# Patient Record
Sex: FEMALE | Race: WHITE | NOT HISPANIC OR LATINO | Employment: UNEMPLOYED | URBAN - METROPOLITAN AREA
[De-identification: names, ages, dates, MRNs, and addresses within clinical notes are randomized per-mention and may not be internally consistent; named-entity substitution may affect disease eponyms.]

---

## 2019-07-24 ENCOUNTER — OFFICE VISIT (OUTPATIENT)
Dept: ORTHOPEDICS | Facility: CLINIC | Age: 59
End: 2019-07-24
Payer: COMMERCIAL

## 2019-07-24 VITALS
SYSTOLIC BLOOD PRESSURE: 135 MMHG | HEART RATE: 65 BPM | DIASTOLIC BLOOD PRESSURE: 85 MMHG | HEIGHT: 62 IN | BODY MASS INDEX: 27.97 KG/M2 | WEIGHT: 152 LBS

## 2019-07-24 DIAGNOSIS — S46.112A PARTIAL DEGENERATIVE RUPTURE OF BICEPS TENDON, LEFT: ICD-10-CM

## 2019-07-24 DIAGNOSIS — M25.512 ACUTE PAIN OF LEFT SHOULDER: ICD-10-CM

## 2019-07-24 DIAGNOSIS — S43.432A ANTERIOR TO POSTERIOR TEAR OF SUPERIOR GLENOID LABRUM OF LEFT SHOULDER: ICD-10-CM

## 2019-07-24 DIAGNOSIS — Z01.818 PRE-OP EXAM: Primary | ICD-10-CM

## 2019-07-24 DIAGNOSIS — S43.432A SUPERIOR GLENOID LABRUM LESION OF LEFT SHOULDER, INITIAL ENCOUNTER: Primary | ICD-10-CM

## 2019-07-24 PROCEDURE — 3008F PR BODY MASS INDEX (BMI) DOCUMENTED: ICD-10-PCS | Mod: ,,, | Performed by: ORTHOPAEDIC SURGERY

## 2019-07-24 PROCEDURE — 3008F BODY MASS INDEX DOCD: CPT | Mod: ,,, | Performed by: ORTHOPAEDIC SURGERY

## 2019-07-24 PROCEDURE — 99204 OFFICE O/P NEW MOD 45 MIN: CPT | Mod: ,,, | Performed by: ORTHOPAEDIC SURGERY

## 2019-07-24 PROCEDURE — 99204 PR OFFICE/OUTPT VISIT, NEW, LEVL IV, 45-59 MIN: ICD-10-PCS | Mod: ,,, | Performed by: ORTHOPAEDIC SURGERY

## 2019-07-24 RX ORDER — DOXYCYCLINE HYCLATE 100 MG
1 TABLET ORAL
COMMUNITY

## 2019-07-24 RX ORDER — OXYBUTYNIN CHLORIDE 10 MG/1
1 TABLET, EXTENDED RELEASE ORAL 3 TIMES DAILY
COMMUNITY

## 2019-07-24 RX ORDER — VENLAFAXINE 75 MG/1
150 TABLET ORAL
COMMUNITY

## 2019-07-24 RX ORDER — CHLORTHALIDONE 25 MG/1
25 TABLET ORAL DAILY
COMMUNITY
End: 2019-11-05

## 2019-07-24 RX ORDER — LISINOPRIL 20 MG/1
20 TABLET ORAL 2 TIMES DAILY
COMMUNITY

## 2019-07-24 NOTE — PROGRESS NOTES
[facility]    Subjective:     Chief Complaint:   Chief Complaint   Patient presents with    Left Shoulder - Pain     Left shoulder pain x few months. States that she had a fall back in January and states that the pain has gotten worse. States that she has pain that radiates down her arm. She did have an XRAY at urgent care and MRI at [facility].        Past Medical History:   Diagnosis Date    Cancer     Hypertension        Past Surgical History:   Procedure Laterality Date    CHOLECYSTECTOMY      HEMICOLECTOMY      HYSTERECTOMY      SHOULDER ARTHROSCOPY         Current Outpatient Medications   Medication Sig    aspirin-calcium carbonate 81 mg-300 mg calcium(777 mg) Tab take 1 tablet (81MG)  by oral route  every day    chlorthalidone (HYGROTEN) 25 MG Tab Take 25 mg by mouth once daily.    doxycycline (VIBRA-TABS) 100 MG tablet Take 1 tablet by mouth.    lisinopril (PRINIVIL,ZESTRIL) 20 MG tablet Take 20 mg by mouth once daily.    oxybutynin (DITROPAN-XL) 10 MG 24 hr tablet Take 1 tablet by mouth.    venlafaxine (EFFEXOR) 75 MG tablet Take 1 tablet by mouth.     No current facility-administered medications for this visit.        Review of patient's allergies indicates:   Allergen Reactions    Codeine      Other reaction(s): Hives       History reviewed. No pertinent family history.    Social History     Socioeconomic History    Marital status:      Spouse name: Not on file    Number of children: Not on file    Years of education: Not on file    Highest education level: Not on file   Occupational History    Not on file   Social Needs    Financial resource strain: Not on file    Food insecurity:     Worry: Not on file     Inability: Not on file    Transportation needs:     Medical: Not on file     Non-medical: Not on file   Tobacco Use    Smoking status: Former Smoker   Substance and Sexual Activity    Alcohol use: Not on file    Drug use: Not on file    Sexual activity: Not on  file   Lifestyle    Physical activity:     Days per week: Not on file     Minutes per session: Not on file    Stress: Not on file   Relationships    Social connections:     Talks on phone: Not on file     Gets together: Not on file     Attends Jehovah's witness service: Not on file     Active member of club or organization: Not on file     Attends meetings of clubs or organizations: Not on file     Relationship status: Not on file   Other Topics Concern    Not on file   Social History Narrative    Not on file       History of present illness: This a 51 year-year-old lady fell January. 2019. Lateral blow to the left shoulder. She's had issues with the shoulder since that fall. Initially not severe slowly getting a little bit worse clearly a daily issue today. No treatment. Right shoulder 20 years ago had a labral repair an that's functioning well.  Review of Systems:    Constitution: Negative for chills, fever, and sweats.  Negative for unexplained weight loss.    HENT:  Negative for headaches and blurry vision.    Cardiovascular:Negative for chest pain or irregular heart beat. Negative for hypertension.    Respiratory:  Negative for cough and shortness of breath.    Gastrointestinal: Negative for abdominal pain, heartburn, melena, nausea, and vomitting.    Genitourinary:  Negative bladder incontinence and dysuria.    Musculoskeletal:  See HPI for details.     Neurological: Negative for numbness.    Psychiatric/Behavioral: Negative for depression.  The patient is not nervous/anxious.      Endocrine: Negative for polyuria    Hematologic/Lymphatic: Negative for bleeding problem.  Does not bruise/bleed easily.    Skin: Negative for poor would healing and rash    Objective:      Physical Examination:    Vital Signs:    Vitals:    07/24/19 0907   BP: 135/85   Pulse: 65       Body mass index is 27.8 kg/m².    This a well-developed, well nourished patient in no acute distress.  They are alert and oriented and cooperative to  examination.        Left shoulder shows that there is mild spasm in the trapezius no Spurling's. She scat mild pain with full forward forward flexion she get mild painful arc she also has some pain with apprehension in the left shoulder. Good passive range of motion no gross weakness to abduction right shoulder has a mild loss of rotation consistent with our labral reconstruction of the past. X-rays came with her left shoulder AP is unremarkable. We have an MRI the comes with her done this week and it shows believe the rotator cuff is intact. There is some fluid in the subdeltoid space is also significant fluid anterior Cee capsule and the subscapularis. There may be some subluxation of the biceps tendon. There is some fluid around the biceps tendon. And I suspect she has a labral tear that on MRI. We do not have an official radiologist's report  Pertinent New Results:    XRAY Report / Interpretation:       Assessment/Plan:      So my impression she 6 months after injury. Therapy is not can make any difference at this point. Fortunately she is right handed does just part-time work. I believe she has a labral tear SLAP type issue. Left shoulder. I think this needs surgery. We will get official radiologist's report. But clearly she still Fluid and an abnormal MRI 6 months down the road. She does have some other medical issues with a hernia that is may or may not be addressed C4 doing her shoulder. She'll let us know whether that's been take precedent or shoulders were take precedent. He did history and physical consent for arthroscopy and labral repair left shoulder with or without biceps tenodesis      This note was created using Dragon voice recognition software that occasionally misinterpreted phrases or words.

## 2019-08-05 ENCOUNTER — TELEPHONE (OUTPATIENT)
Dept: ORTHOPEDICS | Facility: CLINIC | Age: 59
End: 2019-08-05

## 2019-08-05 NOTE — TELEPHONE ENCOUNTER
----- Message from Manuela Ashford sent at 8/5/2019  9:20 AM CDT -----  Contact: patient   Pt called and would like to schedule her surgery on her LT Shoulder. Pt states that she is looking for a date in early November, she has another surgery before then.    PTs # [phone]

## 2019-09-18 ENCOUNTER — OFFICE VISIT (OUTPATIENT)
Dept: ORTHOPEDICS | Facility: CLINIC | Age: 59
End: 2019-09-18
Payer: COMMERCIAL

## 2019-09-18 VITALS
BODY MASS INDEX: 27.97 KG/M2 | HEIGHT: 62 IN | WEIGHT: 152 LBS | DIASTOLIC BLOOD PRESSURE: 74 MMHG | HEART RATE: 65 BPM | SYSTOLIC BLOOD PRESSURE: 110 MMHG

## 2019-09-18 DIAGNOSIS — S43.432A SUPERIOR GLENOID LABRUM LESION OF LEFT SHOULDER, INITIAL ENCOUNTER: Primary | ICD-10-CM

## 2019-09-18 DIAGNOSIS — M25.512 ACUTE PAIN OF LEFT SHOULDER: ICD-10-CM

## 2019-09-18 DIAGNOSIS — S46.112A PARTIAL DEGENERATIVE RUPTURE OF BICEPS TENDON, LEFT: ICD-10-CM

## 2019-09-18 PROCEDURE — 99214 OFFICE O/P EST MOD 30 MIN: CPT | Mod: S$GLB,,, | Performed by: ORTHOPAEDIC SURGERY

## 2019-09-18 PROCEDURE — 3008F BODY MASS INDEX DOCD: CPT | Mod: S$GLB,,, | Performed by: ORTHOPAEDIC SURGERY

## 2019-09-18 PROCEDURE — 99214 PR OFFICE/OUTPT VISIT, EST, LEVL IV, 30-39 MIN: ICD-10-PCS | Mod: S$GLB,,, | Performed by: ORTHOPAEDIC SURGERY

## 2019-09-18 PROCEDURE — 3008F PR BODY MASS INDEX (BMI) DOCUMENTED: ICD-10-PCS | Mod: S$GLB,,, | Performed by: ORTHOPAEDIC SURGERY

## 2019-09-18 RX ORDER — AMLODIPINE BESYLATE 5 MG/1
5 TABLET ORAL DAILY
Refills: 4 | COMMUNITY
Start: 2019-09-12

## 2019-09-18 NOTE — PROGRESS NOTES
[facility]    Subjective:     Chief Complaint:   Chief Complaint   Patient presents with    Left Shoulder - Pain     Left shoulder pain x a while states that she would like to talk about setting up surgery for her shoulder. Pain is about the same.        Past Medical History:   Diagnosis Date    Cancer     Hypertension        Past Surgical History:   Procedure Laterality Date    CHOLECYSTECTOMY      HEMICOLECTOMY      HYSTERECTOMY      SHOULDER ARTHROSCOPY         Current Outpatient Medications   Medication Sig    amLODIPine (NORVASC) 5 MG tablet Take 5 mg by mouth once daily.    aspirin-calcium carbonate 81 mg-300 mg calcium(777 mg) Tab take 1 tablet (81MG)  by oral route  every day    doxycycline (VIBRA-TABS) 100 MG tablet Take 1 tablet by mouth.    lisinopril (PRINIVIL,ZESTRIL) 20 MG tablet Take 20 mg by mouth once daily.    oxybutynin (DITROPAN-XL) 10 MG 24 hr tablet Take 1 tablet by mouth.    venlafaxine (EFFEXOR) 75 MG tablet Take 1 tablet by mouth.    chlorthalidone (HYGROTEN) 25 MG Tab Take 25 mg by mouth once daily.     No current facility-administered medications for this visit.        Review of patient's allergies indicates:   Allergen Reactions    Codeine      Other reaction(s): Hives       History reviewed. No pertinent family history.    Social History     Socioeconomic History    Marital status:      Spouse name: Not on file    Number of children: Not on file    Years of education: Not on file    Highest education level: Not on file   Occupational History    Not on file   Social Needs    Financial resource strain: Not on file    Food insecurity:     Worry: Not on file     Inability: Not on file    Transportation needs:     Medical: Not on file     Non-medical: Not on file   Tobacco Use    Smoking status: Former Smoker   Substance and Sexual Activity    Alcohol use: Not on file    Drug use: Not on file    Sexual activity: Not on file   Lifestyle     Physical activity:     Days per week: Not on file     Minutes per session: Not on file    Stress: Not on file   Relationships    Social connections:     Talks on phone: Not on file     Gets together: Not on file     Attends Congregational service: Not on file     Active member of club or organization: Not on file     Attends meetings of clubs or organizations: Not on file     Relationship status: Not on file   Other Topics Concern    Not on file   Social History Narrative    Not on file       History of present illness: Left shoulder pain. We had prepared for an arthroscopy for labral repair couple months ago she had a hernia fixed on the right doing well when she is ready to have her left shoulder surgery done. She's had an instability surgery right shoulder in the past which she is very happy with.      Review of Systems:    Constitution: Negative for chills, fever, and sweats.  Negative for unexplained weight loss.    HENT:  Negative for headaches and blurry vision.    Cardiovascular:Negative for chest pain or irregular heart beat. Negative for hypertension.    Respiratory:  Negative for cough and shortness of breath.    Gastrointestinal: Negative for abdominal pain, heartburn, melena, nausea, and vomitting.    Genitourinary:  Negative bladder incontinence and dysuria.    Musculoskeletal:  See HPI for details.     Neurological: Negative for numbness.    Psychiatric/Behavioral: Negative for depression.  The patient is not nervous/anxious.      Endocrine: Negative for polyuria    Hematologic/Lymphatic: Negative for bleeding problem.  Does not bruise/bleed easily.    Skin: Negative for poor would healing and rash    Objective:      Physical Examination:    Vital Signs:    Vitals:    09/18/19 0938   BP: 110/74   Pulse: 65       Body mass index is 27.8 kg/m².    This a well-developed, well nourished patient in no acute distress.  They are alert and oriented and cooperative to examination.        Left shoulder again  shows a pain with full range of motion. She does have pain with apprehension. Neck is nontender with no spasm  Pertinent New Results:    XRAY Report / Interpretation:       Assessment/Plan:      's our plan were expecting file a labral instability type issue with the left shoulder. Our plan is scoping her left shoulder with labral repair plus or minus biceps tenodesis. And we'll make the arrangements today history and physical already done. Work not an issue.      This note was created using Dragon voice recognition software that occasionally misinterpreted phrases or words.

## 2019-11-05 ENCOUNTER — HOSPITAL ENCOUNTER (OUTPATIENT)
Dept: PREADMISSION TESTING | Facility: HOSPITAL | Age: 59
Discharge: HOME | End: 2019-11-05
Attending: ORTHOPAEDIC SURGERY
Payer: COMMERCIAL

## 2019-11-05 ENCOUNTER — HOSPITAL ENCOUNTER (OUTPATIENT)
Dept: RADIOLOGY | Facility: HOSPITAL | Age: 59
Discharge: HOME | End: 2019-11-05
Attending: ORTHOPAEDIC SURGERY
Payer: COMMERCIAL

## 2019-11-05 VITALS — HEIGHT: 62 IN | BODY MASS INDEX: 27.42 KG/M2 | WEIGHT: 149 LBS

## 2019-11-05 DIAGNOSIS — Z01.818 PRE-OP EXAM: ICD-10-CM

## 2019-11-05 LAB
ANION GAP SERPL CALC-SCNC: 6 MMOL/L (ref 8–16)
BASOPHILS NFR BLD: 0 % (ref 0–1.9)
BUN SERPL-MCNC: 14 MG/DL (ref 6–20)
CALCIUM SERPL-MCNC: 9.2 MG/DL (ref 8.7–10.5)
CHLORIDE SERPL-SCNC: 99 MMOL/L (ref 95–110)
CO2 SERPL-SCNC: 29 MMOL/L (ref 23–29)
CREAT SERPL-MCNC: 0.6 MG/DL (ref 0.5–1.4)
DIFFERENTIAL METHOD: ABNORMAL
EOSINOPHIL NFR BLD: 1 % (ref 0–8)
ERYTHROCYTE [DISTWIDTH] IN BLOOD BY AUTOMATED COUNT: 12.6 % (ref 11.5–14.5)
EST. GFR  (AFRICAN AMERICAN): >60 ML/MIN/1.73 M^2
EST. GFR  (NON AFRICAN AMERICAN): >60 ML/MIN/1.73 M^2
GLUCOSE SERPL-MCNC: 96 MG/DL (ref 70–110)
HCT VFR BLD AUTO: 35.8 % (ref 37–48.5)
HGB BLD-MCNC: 11.9 G/DL (ref 12–16)
IMM GRANULOCYTES # BLD AUTO: ABNORMAL K/UL
LYMPHOCYTES NFR BLD: 38 % (ref 18–48)
MCH RBC QN AUTO: 29.8 PG (ref 27–31)
MCHC RBC AUTO-ENTMCNC: 33.2 G/DL (ref 32–36)
MCV RBC AUTO: 90 FL (ref 82–98)
MONOCYTES NFR BLD: 15 % (ref 4–15)
NEUTROPHILS NFR BLD: 45 % (ref 38–73)
NEUTS BAND NFR BLD MANUAL: 1 %
NRBC BLD-RTO: 0 /100 WBC
PLATELET # BLD AUTO: 286 K/UL (ref 150–350)
PLATELET BLD QL SMEAR: ABNORMAL
PMV BLD AUTO: 9.3 FL (ref 9.2–12.9)
POTASSIUM SERPL-SCNC: 4.8 MMOL/L (ref 3.5–5.1)
RBC # BLD AUTO: 4 M/UL (ref 4–5.4)
SODIUM SERPL-SCNC: 134 MMOL/L (ref 136–145)
WBC # BLD AUTO: 5.99 K/UL (ref 3.9–12.7)

## 2019-11-05 PROCEDURE — 99900104 DSU ONLY-NO CHARGE-EA ADD'L HR (STAT)

## 2019-11-05 PROCEDURE — 71046 XR CHEST PA AND LATERAL: ICD-10-PCS | Mod: 26,,, | Performed by: RADIOLOGY

## 2019-11-05 PROCEDURE — 71046 X-RAY EXAM CHEST 2 VIEWS: CPT | Mod: 26,,, | Performed by: RADIOLOGY

## 2019-11-05 PROCEDURE — 71046 X-RAY EXAM CHEST 2 VIEWS: CPT | Mod: TC,FY

## 2019-11-05 PROCEDURE — 36415 COLL VENOUS BLD VENIPUNCTURE: CPT

## 2019-11-05 PROCEDURE — 85007 BL SMEAR W/DIFF WBC COUNT: CPT

## 2019-11-05 PROCEDURE — 85027 COMPLETE CBC AUTOMATED: CPT

## 2019-11-05 PROCEDURE — 80048 BASIC METABOLIC PNL TOTAL CA: CPT

## 2019-11-05 PROCEDURE — 99900103 DSU ONLY-NO CHARGE-INITIAL HR (STAT)

## 2019-11-05 NOTE — DISCHARGE INSTRUCTIONS
To confirm, Your doctor has instructed you that surgery is scheduled for: 11/12/19    Thuy  [phone]    Please report to [facility], [facility] the morning of surgery. You must check-in and receive a wristband before going to your procedure.    Pre-Op will call the afternoon prior to surgery between 1:00 and 6:00 PM with the final arrival time.  Phone number: [phone]    PLEASE NOTE:  The surgery schedule has many variables which may affect the time of your surgery case.  Family members should be available if your surgery time changes.  Plan to be here the day of your procedure between 4-6 hours.    MEDICATIONS:  TAKE ONLY THESE MEDICATIONS WITH A SMALL SIP OF WATER THE MORNING OF YOUR PROCEDURE:      AMLODIPINE, EFFEXOR, OXYBUTYNIN,     DO NOT TAKE THESE MEDICATIONS 5-7 DAYS PRIOR to your procedure or per your surgeon's request: ASPIRIN, ALEVE, ADVIL, IBUPROFEN, FISH OIL VITAMIN E, HERBALS - LAST DOSE 11/4/19    (May take Tylenol)              ONLY if you are prescribed any types of blood thinners such as:  Aspirin, Coumadin, Plavix, Pradaxa, Xarelto, Aggrenox, Effient, Eliquis, Savasya, Brilinta, or any other, ask your surgeon whether you should stop taking them and how long before surgery you should stop.  You may also need to verify with the prescribing physician if it is ok to stop your medication.      INSTRUCTIONS IMPORTANT!!  · Do not eat or drink anything between midnight and the time of your procedure- this includes gum, mints, and candy.  · Do not smoke or drink alcoholic beverages 24 hours prior to your procedure.  · Shower the night before AND the morning of your procedure with a Chlorhexidine wash such as Hibiclens or Dial antibacterial soap from the neck down.  Do not get it on your face or in your eyes.  You may use your own shampoo and face wash. This helps your skin to be as bacteria free as possible.    · If you wear contact lenses, dentures, hearing aids or  glasses, bring a container to put them in during surgery and give to a family member for safe keeping.  Please leave all jewelry, piercing's and valuables at home.   · DO NOT remove hair from the surgery site.  Do not shave the incision site unless you are given specific instructions to do so.    · ONLY if you have been diagnosed with sleep apnea please bring your C-PAP machine.  · ONLY if you wear home oxygen please bring your portable oxygen tank the day of your procedure.  · ONLY if you have a history of OPEN HEART SURGERY you will need a clearance from your Cardiologist per Anesthesia.      · ONLY for patients requiring bowel prep, written instructions will be given by your doctor's office.  · ONLY if you have a neuro stimulator, please bring the controller with you the morning of surgery  · ONLY if a type and screen test is needed before surgery, please return:  · If your doctor has scheduled you for an overnight stay, bring a small overnight bag with any personal items you need.  · Make arrangements in advance for transportation home by a responsible adult.  It is not safe to drive a vehicle during the 24 hours after anesthesia.      · Visiting hours are 10:00AM to 8:30PM.  For the safety of all patients, visitors under the age of 12 are not allowed above the first floor of the hospital.    · All [facility] facilities and properties are tobacco free.  Smoking is NOT allowed.       If you have any questions about these instructions, call Pre-Op Admit  Nursing at [phone] or the Pre-Op Day Surgery Unit at [phone].

## 2019-11-11 ENCOUNTER — ANESTHESIA EVENT (OUTPATIENT)
Dept: SURGERY | Facility: HOSPITAL | Age: 59
End: 2019-11-11
Payer: COMMERCIAL

## 2019-11-12 ENCOUNTER — ANESTHESIA (OUTPATIENT)
Dept: SURGERY | Facility: HOSPITAL | Age: 59
End: 2019-11-12
Payer: COMMERCIAL

## 2019-11-12 ENCOUNTER — HOSPITAL ENCOUNTER (OUTPATIENT)
Facility: HOSPITAL | Age: 59
Discharge: HOME | End: 2019-11-12
Attending: ORTHOPAEDIC SURGERY | Admitting: ORTHOPAEDIC SURGERY
Payer: COMMERCIAL

## 2019-11-12 VITALS
TEMPERATURE: 98 F | RESPIRATION RATE: 20 BRPM | HEART RATE: 72 BPM | WEIGHT: 149 LBS | SYSTOLIC BLOOD PRESSURE: 160 MMHG | OXYGEN SATURATION: 93 % | BODY MASS INDEX: 27.42 KG/M2 | HEIGHT: 62 IN | DIASTOLIC BLOOD PRESSURE: 78 MMHG

## 2019-11-12 DIAGNOSIS — S43.432A ANTERIOR TO POSTERIOR TEAR OF SUPERIOR GLENOID LABRUM OF LEFT SHOULDER: ICD-10-CM

## 2019-11-12 DIAGNOSIS — Z01.818 PRE-OP EXAM: ICD-10-CM

## 2019-11-12 PROCEDURE — D9220A PRA ANESTHESIA: ICD-10-PCS | Mod: CRNA,,, | Performed by: NURSE ANESTHETIST, CERTIFIED REGISTERED

## 2019-11-12 PROCEDURE — 71000033 HC RECOVERY, INTIAL HOUR: Performed by: ORTHOPAEDIC SURGERY

## 2019-11-12 PROCEDURE — S0020 INJECTION, BUPIVICAINE HYDRO: HCPCS | Performed by: ANESTHESIOLOGY

## 2019-11-12 PROCEDURE — 25000003 PHARM REV CODE 250: Performed by: ANESTHESIOLOGY

## 2019-11-12 PROCEDURE — 99900104 DSU ONLY-NO CHARGE-EA ADD'L HR (STAT): Performed by: ORTHOPAEDIC SURGERY

## 2019-11-12 PROCEDURE — D9220A PRA ANESTHESIA: Mod: ANES,,, | Performed by: ANESTHESIOLOGY

## 2019-11-12 PROCEDURE — 76942 PR U/S GUIDANCE FOR NEEDLE GUIDANCE: ICD-10-PCS | Mod: 26,,, | Performed by: ANESTHESIOLOGY

## 2019-11-12 PROCEDURE — 37000008 HC ANESTHESIA 1ST 15 MINUTES: Performed by: ORTHOPAEDIC SURGERY

## 2019-11-12 PROCEDURE — 37000009 HC ANESTHESIA EA ADD 15 MINS: Performed by: ORTHOPAEDIC SURGERY

## 2019-11-12 PROCEDURE — 36000710: Performed by: ORTHOPAEDIC SURGERY

## 2019-11-12 PROCEDURE — 63600175 PHARM REV CODE 636 W HCPCS: Performed by: NURSE ANESTHETIST, CERTIFIED REGISTERED

## 2019-11-12 PROCEDURE — 63600175 PHARM REV CODE 636 W HCPCS

## 2019-11-12 PROCEDURE — C1713 ANCHOR/SCREW BN/BN,TIS/BN: HCPCS | Performed by: ORTHOPAEDIC SURGERY

## 2019-11-12 PROCEDURE — D9220A PRA ANESTHESIA: ICD-10-PCS | Mod: ANES,,, | Performed by: ANESTHESIOLOGY

## 2019-11-12 PROCEDURE — 99900103 DSU ONLY-NO CHARGE-INITIAL HR (STAT): Performed by: ORTHOPAEDIC SURGERY

## 2019-11-12 PROCEDURE — 63600175 PHARM REV CODE 636 W HCPCS: Performed by: ANESTHESIOLOGY

## 2019-11-12 PROCEDURE — 71000015 HC POSTOP RECOV 1ST HR: Performed by: ORTHOPAEDIC SURGERY

## 2019-11-12 PROCEDURE — 64415 PR NERVE BLOCK INJ, ANES/STEROID, BRACHIAL PLEXUS, INCL IMAG GUIDANCE: ICD-10-PCS | Mod: 59,LT,, | Performed by: ANESTHESIOLOGY

## 2019-11-12 PROCEDURE — 36000711: Performed by: ORTHOPAEDIC SURGERY

## 2019-11-12 PROCEDURE — D9220A PRA ANESTHESIA: Mod: CRNA,,, | Performed by: NURSE ANESTHETIST, CERTIFIED REGISTERED

## 2019-11-12 PROCEDURE — 25000003 PHARM REV CODE 250: Performed by: NURSE ANESTHETIST, CERTIFIED REGISTERED

## 2019-11-12 PROCEDURE — 27201423 OPTIME MED/SURG SUP & DEVICES STERILE SUPPLY: Performed by: ORTHOPAEDIC SURGERY

## 2019-11-12 PROCEDURE — C9290 INJ, BUPIVACAINE LIPOSOME: HCPCS | Performed by: ANESTHESIOLOGY

## 2019-11-12 PROCEDURE — 63600175 PHARM REV CODE 636 W HCPCS: Performed by: ORTHOPAEDIC SURGERY

## 2019-11-12 PROCEDURE — 71000039 HC RECOVERY, EACH ADD'L HOUR: Performed by: ORTHOPAEDIC SURGERY

## 2019-11-12 PROCEDURE — 76942 ECHO GUIDE FOR BIOPSY: CPT | Mod: 26,,, | Performed by: ANESTHESIOLOGY

## 2019-11-12 PROCEDURE — 76942 ECHO GUIDE FOR BIOPSY: CPT | Performed by: ANESTHESIOLOGY

## 2019-11-12 PROCEDURE — 64415 NJX AA&/STRD BRCH PLXS IMG: CPT | Mod: 59,LT,, | Performed by: ANESTHESIOLOGY

## 2019-11-12 PROCEDURE — 64415 NJX AA&/STRD BRCH PLXS IMG: CPT | Performed by: ANESTHESIOLOGY

## 2019-11-12 DEVICE — ANCHOR BIOCOMPOSITE 2.9X15.5MM: Type: IMPLANTABLE DEVICE | Site: SHOULDER | Status: FUNCTIONAL

## 2019-11-12 RX ORDER — SODIUM CHLORIDE, SODIUM LACTATE, POTASSIUM CHLORIDE, CALCIUM CHLORIDE 600; 310; 30; 20 MG/100ML; MG/100ML; MG/100ML; MG/100ML
INJECTION, SOLUTION INTRAVENOUS CONTINUOUS
Status: DISCONTINUED | OUTPATIENT
Start: 2019-11-12 | End: 2019-11-12 | Stop reason: HOSPADM

## 2019-11-12 RX ORDER — HYDROMORPHONE HYDROCHLORIDE 2 MG/ML
0.2 INJECTION, SOLUTION INTRAMUSCULAR; INTRAVENOUS; SUBCUTANEOUS EVERY 5 MIN PRN
Status: DISCONTINUED | OUTPATIENT
Start: 2019-11-12 | End: 2019-11-12 | Stop reason: HOSPADM

## 2019-11-12 RX ORDER — ACETAMINOPHEN 10 MG/ML
INJECTION, SOLUTION INTRAVENOUS
Status: DISCONTINUED | OUTPATIENT
Start: 2019-11-12 | End: 2019-11-12

## 2019-11-12 RX ORDER — HYDROCODONE BITARTRATE AND ACETAMINOPHEN 10; 325 MG/1; MG/1
1 TABLET ORAL EVERY 4 HOURS PRN
Qty: 30 TABLET | Refills: 0 | Status: SHIPPED | OUTPATIENT
Start: 2019-11-12 | End: 2020-01-06 | Stop reason: ALTCHOICE

## 2019-11-12 RX ORDER — HYDROCODONE BITARTRATE AND ACETAMINOPHEN 5; 325 MG/1; MG/1
1 TABLET ORAL EVERY 4 HOURS PRN
Status: DISCONTINUED | OUTPATIENT
Start: 2019-11-12 | End: 2019-11-12 | Stop reason: HOSPADM

## 2019-11-12 RX ORDER — OXYCODONE HYDROCHLORIDE 5 MG/1
5 TABLET ORAL
Status: DISCONTINUED | OUTPATIENT
Start: 2019-11-12 | End: 2019-11-12 | Stop reason: HOSPADM

## 2019-11-12 RX ORDER — SUCCINYLCHOLINE CHLORIDE 20 MG/ML
INJECTION INTRAMUSCULAR; INTRAVENOUS
Status: DISCONTINUED | OUTPATIENT
Start: 2019-11-12 | End: 2019-11-12

## 2019-11-12 RX ORDER — DEXAMETHASONE SODIUM PHOSPHATE 4 MG/ML
INJECTION, SOLUTION INTRA-ARTICULAR; INTRALESIONAL; INTRAMUSCULAR; INTRAVENOUS; SOFT TISSUE
Status: DISCONTINUED | OUTPATIENT
Start: 2019-11-12 | End: 2019-11-12

## 2019-11-12 RX ORDER — SODIUM CHLORIDE 0.9 % (FLUSH) 0.9 %
10 SYRINGE (ML) INJECTION
Status: DISCONTINUED | OUTPATIENT
Start: 2019-11-12 | End: 2019-11-12 | Stop reason: HOSPADM

## 2019-11-12 RX ORDER — ONDANSETRON 2 MG/ML
INJECTION INTRAMUSCULAR; INTRAVENOUS
Status: DISCONTINUED | OUTPATIENT
Start: 2019-11-12 | End: 2019-11-12

## 2019-11-12 RX ORDER — EPINEPHRINE 1 MG/ML
INJECTION, SOLUTION INTRACARDIAC; INTRAMUSCULAR; INTRAVENOUS; SUBCUTANEOUS
Status: DISCONTINUED | OUTPATIENT
Start: 2019-11-12 | End: 2019-11-12 | Stop reason: HOSPADM

## 2019-11-12 RX ORDER — ASPIRIN 81 MG/1
81 TABLET ORAL DAILY
COMMUNITY

## 2019-11-12 RX ORDER — FENTANYL CITRATE 50 UG/ML
INJECTION, SOLUTION INTRAMUSCULAR; INTRAVENOUS
Status: DISCONTINUED | OUTPATIENT
Start: 2019-11-12 | End: 2019-11-12

## 2019-11-12 RX ORDER — MIDAZOLAM HYDROCHLORIDE 1 MG/ML
INJECTION, SOLUTION INTRAMUSCULAR; INTRAVENOUS
Status: DISCONTINUED | OUTPATIENT
Start: 2019-11-12 | End: 2019-11-12

## 2019-11-12 RX ORDER — PROMETHAZINE HYDROCHLORIDE 25 MG/1
25 TABLET ORAL EVERY 8 HOURS PRN
Qty: 30 TABLET | Refills: 0 | Status: SHIPPED | OUTPATIENT
Start: 2019-11-12 | End: 2021-05-11

## 2019-11-12 RX ORDER — HYDROMORPHONE HYDROCHLORIDE 2 MG/ML
1 INJECTION, SOLUTION INTRAMUSCULAR; INTRAVENOUS; SUBCUTANEOUS EVERY 4 HOURS PRN
Status: DISCONTINUED | OUTPATIENT
Start: 2019-11-12 | End: 2019-11-12 | Stop reason: HOSPADM

## 2019-11-12 RX ORDER — ONDANSETRON 2 MG/ML
4 INJECTION INTRAMUSCULAR; INTRAVENOUS EVERY 12 HOURS PRN
Status: DISCONTINUED | OUTPATIENT
Start: 2019-11-12 | End: 2019-11-12 | Stop reason: HOSPADM

## 2019-11-12 RX ORDER — BUPIVACAINE HYDROCHLORIDE 5 MG/ML
INJECTION, SOLUTION EPIDURAL; INTRACAUDAL
Status: COMPLETED | OUTPATIENT
Start: 2019-11-12 | End: 2019-11-12

## 2019-11-12 RX ORDER — LIDOCAINE HYDROCHLORIDE 10 MG/ML
1 INJECTION, SOLUTION EPIDURAL; INFILTRATION; INTRACAUDAL; PERINEURAL ONCE
Status: COMPLETED | OUTPATIENT
Start: 2019-11-12 | End: 2019-11-12

## 2019-11-12 RX ORDER — FENTANYL CITRATE 50 UG/ML
25 INJECTION, SOLUTION INTRAMUSCULAR; INTRAVENOUS EVERY 5 MIN PRN
Status: DISCONTINUED | OUTPATIENT
Start: 2019-11-12 | End: 2019-11-12 | Stop reason: HOSPADM

## 2019-11-12 RX ORDER — ROCURONIUM BROMIDE 10 MG/ML
INJECTION, SOLUTION INTRAVENOUS
Status: DISCONTINUED | OUTPATIENT
Start: 2019-11-12 | End: 2019-11-12

## 2019-11-12 RX ORDER — LIDOCAINE HCL/PF 100 MG/5ML
SYRINGE (ML) INTRAVENOUS
Status: DISCONTINUED | OUTPATIENT
Start: 2019-11-12 | End: 2019-11-12

## 2019-11-12 RX ORDER — PROPOFOL 10 MG/ML
VIAL (ML) INTRAVENOUS
Status: DISCONTINUED | OUTPATIENT
Start: 2019-11-12 | End: 2019-11-12

## 2019-11-12 RX ADMIN — LIDOCAINE HYDROCHLORIDE 75 MG: 20 INJECTION, SOLUTION INTRAVENOUS at 10:11

## 2019-11-12 RX ADMIN — SODIUM CHLORIDE, SODIUM LACTATE, POTASSIUM CHLORIDE, AND CALCIUM CHLORIDE: .6; .31; .03; .02 INJECTION, SOLUTION INTRAVENOUS at 08:11

## 2019-11-12 RX ADMIN — ACETAMINOPHEN 1000 MG: 10 INJECTION, SOLUTION INTRAVENOUS at 10:11

## 2019-11-12 RX ADMIN — MIDAZOLAM 1 MG: 1 INJECTION INTRAMUSCULAR; INTRAVENOUS at 09:11

## 2019-11-12 RX ADMIN — DEXAMETHASONE SODIUM PHOSPHATE 4 MG: 4 INJECTION, SOLUTION INTRAMUSCULAR; INTRAVENOUS at 10:11

## 2019-11-12 RX ADMIN — PROPOFOL 170 MG: 10 INJECTION, EMULSION INTRAVENOUS at 10:11

## 2019-11-12 RX ADMIN — ONDANSETRON 4 MG: 2 INJECTION, SOLUTION INTRAMUSCULAR; INTRAVENOUS at 10:11

## 2019-11-12 RX ADMIN — BUPIVACAINE 10 ML: 13.3 INJECTION, SUSPENSION, LIPOSOMAL INFILTRATION at 09:11

## 2019-11-12 RX ADMIN — MIDAZOLAM 1 MG: 1 INJECTION INTRAMUSCULAR; INTRAVENOUS at 10:11

## 2019-11-12 RX ADMIN — FENTANYL CITRATE 50 MCG: 50 INJECTION, SOLUTION INTRAMUSCULAR; INTRAVENOUS at 09:11

## 2019-11-12 RX ADMIN — CEFAZOLIN 2 G: 1 INJECTION, POWDER, FOR SOLUTION INTRAVENOUS at 10:11

## 2019-11-12 RX ADMIN — FENTANYL CITRATE 50 MCG: 50 INJECTION, SOLUTION INTRAMUSCULAR; INTRAVENOUS at 10:11

## 2019-11-12 RX ADMIN — SODIUM CHLORIDE, SODIUM LACTATE, POTASSIUM CHLORIDE, AND CALCIUM CHLORIDE: .6; .31; .03; .02 INJECTION, SOLUTION INTRAVENOUS at 11:11

## 2019-11-12 RX ADMIN — BUPIVACAINE HYDROCHLORIDE 5 ML: 5 INJECTION, SOLUTION EPIDURAL; INTRACAUDAL; PERINEURAL at 09:11

## 2019-11-12 RX ADMIN — SUCCINYLCHOLINE CHLORIDE 120 MG: 20 INJECTION, SOLUTION INTRAMUSCULAR; INTRAVENOUS at 10:11

## 2019-11-12 RX ADMIN — ROCURONIUM BROMIDE 10 MG: 10 INJECTION, SOLUTION INTRAVENOUS at 10:11

## 2019-11-12 RX ADMIN — LIDOCAINE HYDROCHLORIDE: 10 INJECTION, SOLUTION EPIDURAL; INFILTRATION; INTRACAUDAL; PERINEURAL at 08:11

## 2019-11-12 NOTE — ANESTHESIA PROCEDURE NOTES
Intubation  Performed by: Max Dutton CRNA  Authorized by: Geovanna Mancilla MD     Intubation:     Induction:  Intravenous    Intubated:  Postinduction    Mask Ventilation:  Easy mask    Attempts:  1    Attempted By:  CRNA    Method of Intubation:  Direct    Blade:  Castorena 2    Laryngeal View Grade: Grade I - full view of chords      Difficult Airway Encountered?: No      Complications:  None    Airway Device Size:  7.0    Style/Cuff Inflation:  Cuffed    Inflation Amount (mL):  4    Tube secured:  20    Secured at:  The lips    Placement Verified By:  Capnometry and Colorimetric ETCO2 device    Complicating Factors:  None    Findings Post-Intubation:  BS equal bilateral

## 2019-11-12 NOTE — ANESTHESIA PREPROCEDURE EVALUATION
11/12/2019  Tammy Peterson is a 59 y.o., female.    Anesthesia Evaluation    I have reviewed the Patient Summary Reports.    I have reviewed the Nursing Notes.      Review of Systems  Anesthesia Hx:  No problems with previous Anesthesia    Cardiovascular:   Hypertension        Physical Exam  General:  Well nourished    Airway/Jaw/Neck:  Airway Findings: Mouth Opening: Normal Tongue: Normal  General Airway Assessment: Adult  Mallampati: II  TM Distance: Normal, at least 6 cm  Jaw/Neck Findings:  Neck ROM: Normal ROM     Eyes/Ears/Nose:  Eyes/Ears/Nose Findings:    Dental:  Dental Findings: In tact   Chest/Lungs:  Chest/Lungs Findings: Normal Respiratory Rate     Heart/Vascular:  Heart Findings: Rate: Normal  Rhythm: Regular Rhythm        Mental Status:  Mental Status Findings:  Cooperative, Alert and Oriented         Anesthesia Plan  Type of Anesthesia, risks & benefits discussed:  Anesthesia Type:  general  Patient's Preference: General  Intra-op Monitoring Plan: standard ASA monitors  Intra-op Monitoring Plan Comments:   Post Op Pain Control Plan: peripheral nerve block, multimodal analgesia, IV/PO Opioids PRN and per primary service following discharge from PACU  Post Op Pain Control Plan Comments:   Induction:   IV  Beta Blocker:  Patient is not currently on a Beta-Blocker (No further documentation required).       Informed Consent: Patient understands risks and agrees with Anesthesia plan.  Questions answered. Anesthesia consent signed with patient.  ASA Score: 2     Day of Surgery Review of History & Physical:    H&P update referred to the surgeon.         Ready For Surgery From Anesthesia Perspective.

## 2019-11-12 NOTE — OP NOTE
DATE OF PROCEDURE:  11/12/2019    PROCEDURES PERFORMED:  Arthroscopy of left shoulder with debridement of glenoid   labrum and anterior and posterior labral repair.    PREOPERATIVE DIAGNOSIS:  Anterior and posterior labral tear.    POSTOPERATIVE DIAGNOSIS:  Instability of left shoulder, anterior and posterior,   with degenerative labral tearing.    ANESTHESIA:  General.    SURGEON:  Aston Lara Jr., M.D.    ASSISTANT:  Marbella Chavez.    PROCEDURE IN DETAIL:  The patient was taken to the Operating Room, placed under   general anesthesia, then turned to lateral position on a beanbag, left arm in   skin traction, prepped and draped the left shoulder in a satisfactory manner.    We introduced our spinal needle posterior in the joint followed with the scope   posterior in the joint and then made an anterior portal inside out.  Inside the   joint, there was lots of debris, ragged tissue and synovitis.  We could   appreciate there were significant degenerative changes on the humeral head, some   of which were debrided, fairly diffuse.  We could see some early degeneration   on the glenoid, clearly more wear on the anterior-inferior corner of the glenoid   and we were impressed by the amount of redundant anterior labral tissue such   that the humeral head was perched anterior and had very impressive anterior   instability of the shoulder, moved all around.  The biceps tendon was still   reasonably attached.  She had an anatomical variant where there was not much   anterior attachment of the biceps, but posterior to the biceps looked pretty   good.  So, we began by debriding the labrum we could debride and we then   detached this anterior-inferior capsular tissue from our anterior portal.  We   then passed a series of sutures and then 2.9 suture anchors to reef up and beef   up that anterior-inferior corner of the glenoid labral and capsular tissue and   we were able to put a total of 5 anchors anterior from roughly the 7  o'clock   position up to the 10 o'clock position, made a big difference in negating that   big void of tissue and the humeral head sat in place much better when we   finished.  We then swapped portals. looked from front to back, again   degenerative labral tissue, I did put 1 more anchor posterior, again advancing   some of the capsular tissue and putting a single anchor posterior.  I tried to   do that a second time, but I could not get the anchor in correctly, so I thought   1 anchor was probably going to help us enough with lot of irritated   degenerative tissue in the shoulder, so when we finished with that, we then went   subdeltoid, took a look around and removed some bursal tissue.  The surface of   the rotator cuff from above looked to be in good shape and there were no   significant degenerative or impingement problems.  We then removed the scope,   sutured the portals with nylon, then applied a bulky dressing and sling and   reversed from anesthesia.      LEXIE/IN  dd: 11/12/2019 12:06:01 (CST)  td: 11/12/2019 13:41:46 (CST)  Doc ID   #[id number]  Job ID #[id number]    CC:

## 2019-11-12 NOTE — BRIEF OP NOTE
[facility]  Brief Operative Note     SUMMARY     Surgery Date: 11/12/2019     Surgeon(s) and Role:     * Aston Lara Jr., MD - Primary    Assisting Surgeon: None    Pre-op Diagnosis:  Pre-op exam [Z01.818]    Post-op Diagnosis:  Post-Op Diagnosis Codes:     * Pre-op exam [Z01.818]    Procedure(s) (LRB):  ARTHROSCOPY, SHOULDER (Left)    Anesthesia: General    Description of the findings of the procedure:     Estimated Blood Loss: * No values recorded between 11/12/2019 10:45 AM and 11/12/2019 11:58 AM *         Specimens:   Specimen (12h ago, onward)    None          Discharge Note    SUMMARY     Admit Date: 11/12/2019    Discharge Date and Time:  11/12/2019 12:00 PM    Hospital Course (synopsis of major diagnoses, care, treatment, and services provided during the course of the hospital stay): [facility]     Final Diagnosis: Post-Op Diagnosis Codes:     * Pre-op exam [Z01.818]    Disposition: Home or Self Care    Follow Up/Patient Instructions:     Medications:  Reconciled Home Medications:   Current Discharge Medication List      START taking these medications    Details   HYDROcodone-acetaminophen (NORCO)  mg per tablet Take 1 tablet by mouth every 4 (four) hours as needed for Pain.  Qty: 30 tablet, Refills: 0    Comments: Quantity prescribed more than 7 day supply? No      promethazine (PHENERGAN) 25 MG tablet Take 1 tablet (25 mg total) by mouth every 8 (eight) hours as needed for Nausea.  Qty: 30 tablet, Refills: 0         CONTINUE these medications which have NOT CHANGED    Details   amLODIPine (NORVASC) 5 MG tablet Take 5 mg by mouth once daily.  Refills: 4      aspirin (ECOTRIN) 81 MG EC tablet Take 81 mg by mouth once daily.      doxycycline (VIBRA-TABS) 100 MG tablet Take 1 tablet by mouth.      lisinopril (PRINIVIL,ZESTRIL) 20 MG tablet Take 20 mg by mouth once daily.      oxybutynin (DITROPAN-XL) 10 MG 24 hr tablet Take 1 tablet by mouth.      venlafaxine  (EFFEXOR) 75 MG tablet Take 1 tablet by mouth.           Discharge Procedure Orders   SLING [employer]OPEDIC MEDIUM FOR HOME USE     Diet general     Ice to affected area     No driving, operating heavy equipment or signing legal documents while taking pain medication     Call MD for:  temperature >100.4     Call MD for:  persistent nausea and vomiting     Call MD for:  severe uncontrolled pain     Call MD for:  difficulty breathing, headache or visual disturbances     Call MD for:  redness, tenderness, or signs of infection (pain, swelling, redness, odor or green/yellow discharge around incision site)     Call MD for:  hives     Call MD for:  persistent dizziness or light-headedness     Call MD for:  extreme fatigue     Remove dressing in 48 hours     Shower on day dressing removed (No bath)     Follow-up Information     Aston Lara Jr, MD In 2 weeks.    Specialties:  Orthopedic Surgery, Surgery  Contact information:  [address] [facility]  [address] [place] [id number]  [phone]                 [facility] Operative Note     SUMMARY     Surgery Date: 11/12/2019     Surgeon(s) and Role:     * Aston Lara Jr., MD - Primary    Assisting Surgeon: None    Pre-op Diagnosis:  Pre-op exam [Z01.818]    Post-op Diagnosis:  Post-Op Diagnosis Codes:      Procedure(s) (LRB):  ARTHROSCOPY, SHOULDER (Left)    Anesthesia: General    Description of the findings of the procedure:     Estimated Blood Loss: 0* No values recorded between 11/12/2019 10:45 AM and 11/12/2019 11:58 AM0         Specimens:   Specimen (12h ago, onward)    None          Discharge Note    SUMMARY     Admit Date: 11/12/2019    Discharge Date and Time:  11/12/2019 12:00 PM    Hospital Course (synopsis of major diagnoses, care, treatment, and services provided during the course of the hospital stay): [facility]     Final Diagnosis: Post-Op Diagnosis Codes:     * Pre-op exam [Z01.818]labral tear    Disposition: Home or Self  Care    Follow Up/Patient Instructions:     Medications:  Reconciled Home Medications:   Current Discharge Medication List      START taking these medications    Details   HYDROcodone-acetaminophen (NORCO)  mg per tablet Take 1 tablet by mouth every 4 (four) hours as needed for Pain.  Qty: 30 tablet, Refills: 0    Comments: Quantity prescribed more than 7 day supply? No      promethazine (PHENERGAN) 25 MG tablet Take 1 tablet (25 mg total) by mouth every 8 (eight) hours as needed for Nausea.  Qty: 30 tablet, Refills: 0         CONTINUE these medications which have NOT CHANGED    Details   amLODIPine (NORVASC) 5 MG tablet Take 5 mg by mouth once daily.  Refills: 4      aspirin (ECOTRIN) 81 MG EC tablet Take 81 mg by mouth once daily.      doxycycline (VIBRA-TABS) 100 MG tablet Take 1 tablet by mouth.      lisinopril (PRINIVIL,ZESTRIL) 20 MG tablet Take 20 mg by mouth once daily.      oxybutynin (DITROPAN-XL) 10 MG 24 hr tablet Take 1 tablet by mouth.      venlafaxine (EFFEXOR) 75 MG tablet Take 1 tablet by mouth.           Discharge Procedure Orders   SLING ORTHOPEDIC MEDIUM FOR HOME USE     Diet general     Ice to affected area     No driving, operating heavy equipment or signing legal documents while taking pain medication     Call MD for:  temperature >100.4     Call MD for:  persistent nausea and vomiting     Call MD for:  severe uncontrolled pain     Call MD for:  difficulty breathing, headache or visual disturbances     Call MD for:  redness, tenderness, or signs of infection (pain, swelling, redness, odor or green/yellow discharge around incision site)     Call MD for:  hives     Call MD for:  persistent dizziness or light-headedness     Call MD for:  extreme fatigue     Remove dressing in 48 hours     Shower on day dressing removed (No bath)     Follow-up Information     Aston Lara Jr, MD In 2 weeks.    Specialties:  Orthopedic Surgery, Surgery  Contact information:  [address] [facility]  [address]  [facility] [address]  [phone]               [facility]  Brief Operative Note    SUMMARY     Surgery Date: 11/12/2019     Surgeon(s) and Role:     * Aston Lara Jr., MD - Primary    Assisting Surgeon: None    Pre-op Diagnosis:  Pre-op exam [Z01.818]    Post-op Diagnosis:  Post-Op Diagnosis Codes:     * Pre-op exam [Z01.818]    Procedure(s) (LRB):  ARTHROSCOPY, SHOULDER (Left)  anter and post labral repair  Anesthesia: General    Description of the findings of the procedure:instabiklity    Estimated Blood Loss: * No values recorded between 11/12/2019 10:45 AM and 11/12/2019 11:58 AM *         Specimens:   Specimen (12h ago, onward)    None

## 2019-11-12 NOTE — TRANSFER OF CARE
"Anesthesia Transfer of Care Note    Patient: Tammy Peterson    Procedure(s) Performed: Procedure(s) (LRB):  ARTHROSCOPY, SHOULDER (Left)    Patient location: PACU    Anesthesia Type: general    Transport from OR: Transported from OR on 2-3 L/min O2 by NC with adequate spontaneous ventilation    Post pain: adequate analgesia    Post assessment: no apparent anesthetic complications    Post vital signs: stable    Level of consciousness: awake    Nausea/Vomiting: no nausea/vomiting    Complications: none    Transfer of care protocol was followed      Last vitals:   Visit Vitals  BP (!) 146/81   Pulse 66   Temp 36.7 °C (98.1 °F) (Skin)   Resp 16   Ht 5' 2" (1.575 m)   Wt 67.6 kg (149 lb)   SpO2 100%   Breastfeeding? No   BMI 27.25 kg/m²     "

## 2019-11-12 NOTE — ANESTHESIA POSTPROCEDURE EVALUATION
Anesthesia Post Evaluation    Patient: Tammy Peterson    Procedure(s) Performed: Procedure(s) (LRB):  ARTHROSCOPY, SHOULDER (Left)    Final Anesthesia Type: general  Patient location during evaluation: PACU  Patient participation: Yes- Able to Participate  Level of consciousness: awake and alert, oriented and awake  Post-procedure vital signs: reviewed and stable  Pain management: adequate  Airway patency: patent  PONV status at discharge: No PONV  Anesthetic complications: no      Cardiovascular status: blood pressure returned to baseline and hemodynamically stable  Respiratory status: unassisted, spontaneous ventilation and room air  Hydration status: euvolemic  Follow-up not needed.          Vitals Value Taken Time   /78 11/12/2019  1:36 PM   Temp 36.6 °C (97.9 °F) 11/12/2019  1:00 PM   Pulse 68 11/12/2019  1:36 PM   Resp 39 11/12/2019  1:36 PM   SpO2 95 % 11/12/2019  1:36 PM   Vitals shown include unvalidated device data.      Event Time     Out of Recovery 13:36:34          Pain/Beena Score: Pain Rating Prior to Med Admin: 0 (11/12/2019  1:15 PM)  Pain Rating Post Med Admin: 0 (11/12/2019  1:52 PM)  Beena Score: 10 (11/12/2019  1:52 PM)

## 2019-11-12 NOTE — ANESTHESIA PROCEDURE NOTES
Peripheral Block    Patient location during procedure: pre-op   Block not for primary anesthetic.  Reason for block: at surgeon's request and post-op pain management   Post-op Pain Location: left shoulder  Start time: 11/12/2019 9:01 AM  Timeout: 11/12/2019 9:00 AM   End time: 11/12/2019 9:05 AM    Staffing  Authorizing Provider: Geovanna Mancilla MD  Performing Provider: Geovanna Mancilla MD    Preanesthetic Checklist  Completed: patient identified, site marked, surgical consent, pre-op evaluation, timeout performed, IV checked, risks and benefits discussed and monitors and equipment checked  Peripheral Block  Patient position: sitting  Prep: ChloraPrep  Patient monitoring: heart rate, cardiac monitor, continuous pulse ox, continuous capnometry and frequent blood pressure checks  Block type: interscalene  Laterality: left  Injection technique: single shot  Needle  Needle type: Stimuplex   Needle gauge: 22 G  Needle length: 2 in  Needle localization: anatomical landmarks and ultrasound guidance   -ultrasound image captured on disc.  Assessment  Injection assessment: negative aspiration, negative parasthesia and local visualized surrounding nerve  Paresthesia pain: none  Heart rate change: no  Slow fractionated injection: yes  Additional Notes  VSS.  [facility] RN monitoring vitals throughout procedure.  Patient tolerated procedure well.     exparel 10mL

## 2019-11-12 NOTE — DISCHARGE INSTRUCTIONS
"Discharge Instructions: After Your Surgery/Procedure  Youve just had surgery. During surgery you were given medicine called anesthesia to keep you relaxed and free of pain. After surgery you may have some pain or nausea. This is common. Here are some tips for feeling better and getting well after surgery.     Stay on schedule with your medication.   Going home  Your doctor or nurse will show you how to take care of yourself when you go home. He or she will also answer your questions. Have an adult family member or friend drive you home.      For your safety we recommend these precaution for the first 24 hours after your procedure:  · Do not drive or use heavy equipment.  · Do not make important decisions or sign legal papers.  · Do not drink alcohol.  · Have someone stay with you, if needed. He or she can watch for problems and help keep you safe.  · Your concentration, balance, coordination, and judgement may be impaired for many hours after anesthesia.  Use caution when ambulating or standing up.     · You may feel weak and "washed out" after anesthesia and surgery.      Subtle residual effects of general anesthesia or sedation with regional / local anesthesia can last more than 24 hours.  Rest for the remainder of the day or longer if your Doctor/Surgeon has advised you to do so.  Although you may feel normal within the first 24 hours, your reflexes and mental ability may be impaired without you realizing it.  You may feel dizzy, lightheaded or sleepy for 24 hours or longer.      Be sure to go to all follow-up visits with your doctor. And rest after your surgery for as long as your doctor tells you to.  Coping with pain  If you have pain after surgery, pain medicine will help you feel better. Take it as told, before pain becomes severe. Also, ask your doctor or pharmacist about other ways to control pain. This might be with heat, ice, or relaxation. And follow any other instructions your surgeon or nurse gives " you.  Tips for taking pain medicine  To get the best relief possible, remember these points:  · Pain medicines can upset your stomach. Taking them with a little food may help.  · Most pain relievers taken by mouth need at least 20 to 30 minutes to start to work.  · Taking medicine on a schedule can help you remember to take it. Try to time your medicine so that you can take it before starting an activity. This might be before you get dressed, go for a walk, or sit down for dinner.  · Constipation is a common side effect of pain medicines. Call your doctor before taking any medicines such as laxatives or stool softeners to help ease constipation. Also ask if you should skip any foods. Drinking lots of fluids and eating foods such as fruits and vegetables that are high in fiber can also help. Remember, do not take laxatives unless your surgeon has prescribed them.  · Drinking alcohol and taking pain medicine can cause dizziness and slow your breathing. It can even be deadly. Do not drink alcohol while taking pain medicine.  · Pain medicine can make you react more slowly to things. Do not drive or run machinery while taking pain medicine.  Your health care provider may tell you to take acetaminophen to help ease your pain. Ask him or her how much you are supposed to take each day. Acetaminophen or other pain relievers may interact with your prescription medicines or other over-the-counter (OTC) drugs. Some prescription medicines have acetaminophen and other ingredients. Using both prescription and OTC acetaminophen for pain can cause you to overdose. Read the labels on your OTC medicines with care. This will help you to clearly know the list of ingredients, how much to take, and any warnings. It may also help you not take too much acetaminophen. If you have questions or do not understand the information, ask your pharmacist or health care provider to explain it to you before you take the OTC medicine.  Managing  nausea  Some people have an upset stomach after surgery. This is often because of anesthesia, pain, or pain medicine, or the stress of surgery. These tips will help you handle nausea and eat healthy foods as you get better. If you were on a special food plan before surgery, ask your doctor if you should follow it while you get better. These tips may help:  · Do not push yourself to eat. Your body will tell you when to eat and how much.  · Start off with clear liquids and soup. They are easier to digest.  · Next try semi-solid foods, such as mashed potatoes, applesauce, and gelatin, as you feel ready.  · Slowly move to solid foods. Dont eat fatty, rich, or spicy foods at first.  · Do not force yourself to have 3 large meals a day. Instead eat smaller amounts more often.  · Take pain medicines with a small amount of solid food, such as crackers or toast, to avoid nausea.     Call your surgeon if  · You still have pain an hour after taking medicine. The medicine may not be strong enough.  · You feel too sleepy, dizzy, or groggy. The medicine may be too strong.  · You have side effects like nausea, vomiting, or skin changes, such as rash, itching, or hives.       If you have obstructive sleep apnea  You were given anesthesia medicine during surgery to keep you comfortable and free of pain. After surgery, you may have more apnea spells because of this medicine and other medicines you were given. The spells may last longer than usual.   At home:  · Keep using the continuous positive airway pressure (CPAP) device when you sleep. Unless your health care provider tells you not to, use it when you sleep, day or night. CPAP is a common device used to treat obstructive sleep apnea.  · Talk with your provider before taking any pain medicine, muscle relaxants, or sedatives. Your provider will tell you about the possible dangers of taking these medicines.  © 7965-7887 [employer]. [address] [place] [address]  [place] [address]. All rights reserved. This information is not intended as a substitute for professional medical care. Always follow your healthcare professional's instructions.        General Information:    1.  Do not drink alcoholic beverages including beer for 24 hours or as long as you are on pain medication..  2.  Do not drive a motor vehicle, operate machinery or power tools, or signs legal papers for 24 hours or as long as you are on pain medication.   3.  You may experience light-headedness, dizziness, and sleepiness following surgery. Please do not stay alone. A responsible adult should be with you for this 24 hour period.  4.  Go home and rest.    5. Progress slowly to a normal diet unless instructed.  Otherwise, begin with liquids such as soft drinks, then soup and crackers working up to solid foods. Drink plenty of nonalcoholic fluids.  6.  Certain anesthetics and pain medications produce nausea and vomiting in certain       individuals. If nausea becomes a problem at home, call you doctor.    7. A nurse will be calling you sometime after surgery. Do not be alarmed. This is our way of finding out how you are doing.    8. Several times every hour while you are awake, take 2-3 deep breaths and cough. If you had stomach surgery hold a pillow or rolled towel firmly against your stomach before you cough. This will help with any pain the cough might cause.  9. Several times every hour while you are awake, pump and flex your feet 5-6 times and do foot circles. This will help prevent blood clots.    10.Call your doctor for severe pain, bleeding, fever, or signs or symptoms of infection (pain, swelling, redness, foul odor, drainage).    Post op instructions for prevention of DVT  What is deep vein thrombosis?  Deep vein thrombosis (DVT) is the medical term for blood clots in the deep veins of the leg.  These blood clots can be dangerous.  A DVT can block a blood vessel and keep blood from getting where it needs to go.   Another problem is that the clot can travel to other parts of the body such as the lungs.  A clot that travels to the lungs is called a pulmonary embolus (PE) and can cause serious problems with breathing which can lead to death.  Am I at risk for DVT/PE?  If you are not very active, you are at risk of DVT.  Anyone confined to bed, sitting for long periods of time, recovering from surgery, etc. increases the risk of DVT.  Other risk factors are cancer diagnosis, certain medications, estrogen replacement in any form,older age, obesity, pregnancy, smoking, history of clotting disorders, and dehydration.  How will I know if I have a DVT?   Swelling in the lower leg   Pain   Warmth, redness, hardness or bulging of the vein  If you have any of these symptoms, call your doctors office right away.  Some people will not have any symptoms until the clot moves to the lungs.  What are the symptoms of a PE?   Panting, shortness of breath, or trouble breathing   Sharp, knife-like chest pain when you breathe   Coughing or coughing up blood   Rapid heartbeat  If you have any of these symptoms or get worse quickly, call 911 for emergency treatment.  How can I prevent a DVT?   Avoid long periods of inactivity and dont cross your legs--get up and walk around every hour or so.   Stay active--walking after surgery is highly encouraged.  This means you should get out of the house and walk in the neighborhood.  Going up and down stairs will not impair healing (unless advised against such activity by your doctor).     Drink plenty of noncaffeinated, nonalcoholic fluids each day to prevent dehydration.   Wear special support stockings, if they have been advised by your doctor.   If you travel, stop at least once an hour and walk around.   Avoid smoking (assistance with stopping is available through your healthcare provider)  Always notify your doctor if you are not able to follow the post operative instructions that are  given to you at the time of discharge.  It may be necessary to prescribe one of the medications available to prevent DVT.    We hope your stay was comfortable as you heal now, mend and rest.    For we have enjoyed taking care of you by giving you our best.    And as you get better, by regaining your health and strength;   We count it as a privilege to have served you and hope your time at [facility] was well spent.      Thank  You!!!

## 2019-11-12 NOTE — PLAN OF CARE
Denies pain.  aao x 4.  Tolerating po fluids.  Vs stable.  Will discharge when teaching completed if continues to meet discharge criteria at that time.

## 2019-11-25 ENCOUNTER — OFFICE VISIT (OUTPATIENT)
Dept: ORTHOPEDICS | Facility: CLINIC | Age: 59
End: 2019-11-25
Payer: COMMERCIAL

## 2019-11-25 VITALS
HEIGHT: 62 IN | BODY MASS INDEX: 27.42 KG/M2 | HEART RATE: 71 BPM | DIASTOLIC BLOOD PRESSURE: 83 MMHG | WEIGHT: 149 LBS | SYSTOLIC BLOOD PRESSURE: 125 MMHG

## 2019-11-25 DIAGNOSIS — Z98.890 STATUS POST ARTHROSCOPY OF LEFT SHOULDER: Primary | ICD-10-CM

## 2019-11-25 PROCEDURE — 99024 PR POST-OP FOLLOW-UP VISIT: ICD-10-PCS | Mod: S$GLB,,, | Performed by: ORTHOPAEDIC SURGERY

## 2019-11-25 PROCEDURE — 99024 POSTOP FOLLOW-UP VISIT: CPT | Mod: S$GLB,,, | Performed by: ORTHOPAEDIC SURGERY

## 2019-11-25 RX ORDER — HYDROCODONE BITARTRATE AND ACETAMINOPHEN 10; 325 MG/1; MG/1
1 TABLET ORAL EVERY 6 HOURS PRN
Qty: 28 TABLET | Refills: 0 | Status: SHIPPED | OUTPATIENT
Start: 2019-11-25 | End: 2019-12-02

## 2019-11-25 NOTE — PROGRESS NOTES
Former[facility] ORTHOPEDICS POST-OP NOTE    Subjective:           Chief Complaint:   Chief Complaint   Patient presents with    Left Shoulder - Pain     Left shoulder scope 11.12.19. Suture removal. States that her shoulder is  Doing ok, states that it is still a little sore.        Past Medical History:   Diagnosis Date    Cancer 02/2111    colon    Hypertension        Past Surgical History:   Procedure Laterality Date    CHOLECYSTECTOMY      COLON SURGERY  02/2011    colectomy    HEMICOLECTOMY      HERNIA REPAIR Bilateral 09/2019    [facility]    HYSTERECTOMY      SHOULDER ARTHROSCOPY      SHOULDER ARTHROSCOPY W/ SUPERIOR LABRAL ANTERIOR POSTERIOR LESION REPAIR Left 11/12/2019    Procedure: ARTHROSCOPY, SHOULDER;  Surgeon: Aston Lara Jr., MD;  Location: [facility];  Service: Orthopedics;  Laterality: Left;       Current Outpatient Medications   Medication Sig    amLODIPine (NORVASC) 5 MG tablet Take 5 mg by mouth once daily.    aspirin (ECOTRIN) 81 MG EC tablet Take 81 mg by mouth once daily.    doxycycline (VIBRA-TABS) 100 MG tablet Take 1 tablet by mouth.    HYDROcodone-acetaminophen (NORCO)  mg per tablet Take 1 tablet by mouth every 4 (four) hours as needed for Pain.    lisinopril (PRINIVIL,ZESTRIL) 20 MG tablet Take 20 mg by mouth once daily.    oxybutynin (DITROPAN-XL) 10 MG 24 hr tablet Take 1 tablet by mouth.    promethazine (PHENERGAN) 25 MG tablet Take 1 tablet (25 mg total) by mouth every 8 (eight) hours as needed for Nausea.    venlafaxine (EFFEXOR) 75 MG tablet Take 1 tablet by mouth.     No current facility-administered medications for this visit.        Review of patient's allergies indicates:   Allergen Reactions    Codeine      Other reaction(s): Hives       History reviewed. No pertinent family history.    Social History     Socioeconomic History    Marital status:      Spouse name: Not on file    Number of children: Not on file    Years of education: Not on file     Highest education level: Not on file   Occupational History    Not on file   Social Needs    Financial resource strain: Not on file    Food insecurity:     Worry: Not on file     Inability: Not on file    Transportation needs:     Medical: Not on file     Non-medical: Not on file   Tobacco Use    Smoking status: Former Smoker     Packs/day: 1.00     Types: Cigarettes     Last attempt to quit: 2011     Years since quittin.9    Smokeless tobacco: Never Used   Substance and Sexual Activity    Alcohol use: Yes     Comment: Socially    Drug use: Never    Sexual activity: Not on file   Lifestyle    Physical activity:     Days per week: Not on file     Minutes per session: Not on file    Stress: Not on file   Relationships    Social connections:     Talks on phone: Not on file     Gets together: Not on file     Attends [employer] service: Not on file     Active member of club or organization: Not on file     Attends meetings of clubs or organizations: Not on file     Relationship status: Not on file   Other Topics Concern    Not on file   Social History Narrative    Not on file       History of present illness:  Follow-up of left shoulder repair 2 weeks.  We did some anterior and posterior repairing on this shoulder.  So far she looks good.    Review of Systems:    Musculoskeletal:  See HPI      Objective:        Physical Examination:    Vital Signs:    Vitals:    19 1400   BP: 125/83   Pulse: 71       Body mass index is 27.25 kg/m².    This a well-developed, well nourished patient in no acute distress.  They are alert and oriented and cooperative to examination.        So physical exam shows that wound looked very good left shoulder lower arm work spine. She can get about 60° of forward flexion no problem  Pertinent New Results:    XRAY Report / Interpretation:       Assessment/Plan:      So plan simple sling renewed Norco 10 start physical therapy at the 4 week isabelle.  See her back in 6 weeks.   AP x-ray next time. No issues at work    This note was created using Dragon voice recognition software that occasionally misinterpreted phrases or words.

## 2020-01-06 ENCOUNTER — OFFICE VISIT (OUTPATIENT)
Dept: ORTHOPEDICS | Facility: CLINIC | Age: 60
End: 2020-01-06
Payer: COMMERCIAL

## 2020-01-06 VITALS
SYSTOLIC BLOOD PRESSURE: 138 MMHG | HEIGHT: 62 IN | BODY MASS INDEX: 27.42 KG/M2 | WEIGHT: 149 LBS | HEART RATE: 89 BPM | DIASTOLIC BLOOD PRESSURE: 89 MMHG

## 2020-01-06 DIAGNOSIS — Z98.890 STATUS POST ARTHROSCOPY OF LEFT SHOULDER: Primary | ICD-10-CM

## 2020-01-06 PROCEDURE — 99024 POSTOP FOLLOW-UP VISIT: CPT | Mod: S$GLB,,, | Performed by: ORTHOPAEDIC SURGERY

## 2020-01-06 PROCEDURE — 99024 PR POST-OP FOLLOW-UP VISIT: ICD-10-PCS | Mod: S$GLB,,, | Performed by: ORTHOPAEDIC SURGERY

## 2020-01-06 NOTE — PROGRESS NOTES
[facility] ORTHOPEDICS POST-OP NOTE    Subjective:           Chief Complaint:   Chief Complaint   Patient presents with    Left Shoulder - Post-op Evaluation     Left shoulder scope 11.12.19. States that her shoulder is doing good. States that she still has some pain down her arm.        Past Medical History:   Diagnosis Date    Cancer 02/2111    colon    Hypertension        Past Surgical History:   Procedure Laterality Date    CHOLECYSTECTOMY      COLON SURGERY  02/2011    colectomy    HEMICOLECTOMY      HERNIA REPAIR Bilateral 09/2019    [facility]    HYSTERECTOMY      SHOULDER ARTHROSCOPY      SHOULDER ARTHROSCOPY W/ SUPERIOR LABRAL ANTERIOR POSTERIOR LESION REPAIR Left 11/12/2019    Procedure: ARTHROSCOPY, SHOULDER;  Surgeon: Aston Lara Jr., MD;  Location: [facility];  Service: Orthopedics;  Laterality: Left;       Current Outpatient Medications   Medication Sig    amLODIPine (NORVASC) 5 MG tablet Take 5 mg by mouth once daily.    aspirin (ECOTRIN) 81 MG EC tablet Take 81 mg by mouth once daily.    doxycycline (VIBRA-TABS) 100 MG tablet Take 1 tablet by mouth.    lisinopril (PRINIVIL,ZESTRIL) 20 MG tablet Take 20 mg by mouth once daily.    oxybutynin (DITROPAN-XL) 10 MG 24 hr tablet Take 1 tablet by mouth.    promethazine (PHENERGAN) 25 MG tablet Take 1 tablet (25 mg total) by mouth every 8 (eight) hours as needed for Nausea.    venlafaxine (EFFEXOR) 75 MG tablet Take 1 tablet by mouth.    HYDROcodone-acetaminophen (NORCO)  mg per tablet Take 1 tablet by mouth every 4 (four) hours as needed for Pain. (Patient not taking: Reported on 1/6/2020)     No current facility-administered medications for this visit.        Review of patient's allergies indicates:   Allergen Reactions    Codeine      Other reaction(s): Hives       History reviewed. No pertinent family history.    Social History     Socioeconomic History    Marital status:      Spouse name: Not on file    Number of children: Not  on file    Years of education: Not on file    Highest education level: Not on file   Occupational History    Not on file   Social Needs    Financial resource strain: Not on file    Food insecurity:     Worry: Not on file     Inability: Not on file    Transportation needs:     Medical: Not on file     Non-medical: Not on file   Tobacco Use    Smoking status: Former Smoker     Packs/day: 1.00     Types: Cigarettes     Last attempt to quit: 2011     Years since quittin.0    Smokeless tobacco: Never Used   Substance and Sexual Activity    Alcohol use: Yes     Comment: Socially    Drug use: Never    Sexual activity: Not on file   Lifestyle    Physical activity:     Days per week: Not on file     Minutes per session: Not on file    Stress: Not on file   Relationships    Social connections:     Talks on phone: Not on file     Gets together: Not on file     Attends [employer] service: Not on file     Active member of club or organization: Not on file     Attends meetings of clubs or organizations: Not on file     Relationship status: Not on file   Other Topics Concern    Not on file   Social History Narrative    Not on file       History of present illness:  Follow-up on her left shoulder.  We did anterior and posterior labral repair.  For instability.  She is doing well.  She is attending physical therapy.  We are past 6 weeks.  She is doing well with the shoulder.      Review of Systems:    Musculoskeletal:  See HPI      Objective:        Physical Examination:    Vital Signs:    Vitals:    20 0904   BP: 138/89   Pulse: 89       Body mass index is 27.25 kg/m².    This a well-developed, well nourished patient in no acute distress.  They are alert and oriented and cooperative to examination.        Physical exam shows she has good forward flexion. She has no painful arc.  She has regained about 2/3 internal and external rotation.  Pertinent New Results:    XRAY Report / Interpretation:   AP  x-ray left shoulder shows subdeltoid space looks normal. Humeral head looks normal.  There are minimal multiple radiolucencies consistent with anchor holes in the glenoid.  No acute findings.  No metallic hardware.  Signature    Assessment/Plan:      So my impression of think she is recovering well with her arthroscopic reconstruction.  We will of continue PT.  She does not have a job of to report.  She has medicine.  See her back in 6 weeks for final checkup.  So far doing well    This note was created using Dragon voice recognition software that occasionally misinterpreted phrases or words.

## 2020-02-10 ENCOUNTER — OFFICE VISIT (OUTPATIENT)
Dept: ORTHOPEDICS | Facility: CLINIC | Age: 60
End: 2020-02-10
Payer: COMMERCIAL

## 2020-02-10 VITALS
SYSTOLIC BLOOD PRESSURE: 146 MMHG | HEART RATE: 72 BPM | DIASTOLIC BLOOD PRESSURE: 86 MMHG | BODY MASS INDEX: 27.42 KG/M2 | HEIGHT: 62 IN | WEIGHT: 149 LBS

## 2020-02-10 DIAGNOSIS — Z98.890 STATUS POST ARTHROSCOPY OF LEFT SHOULDER: Primary | ICD-10-CM

## 2020-02-10 PROCEDURE — 99024 POSTOP FOLLOW-UP VISIT: CPT | Mod: S$GLB,,, | Performed by: ORTHOPAEDIC SURGERY

## 2020-02-10 PROCEDURE — 99024 PR POST-OP FOLLOW-UP VISIT: ICD-10-PCS | Mod: S$GLB,,, | Performed by: ORTHOPAEDIC SURGERY

## 2020-02-10 NOTE — PROGRESS NOTES
Former[facility] ORTHOPEDICS POST-OP NOTE    Subjective:           Chief Complaint:   Chief Complaint   Patient presents with    Left Shoulder - Post-op Evaluation     Left shoulder scope 11.12.29. States that her shoulder is doing pretty good, Finished PT,  Still has some pain in the bicep.        Past Medical History:   Diagnosis Date    Cancer 02/2111    colon    Hypertension        Past Surgical History:   Procedure Laterality Date    CHOLECYSTECTOMY      COLON SURGERY  02/2011    colectomy    HEMICOLECTOMY      HERNIA REPAIR Bilateral 09/2019    [facility]    HYSTERECTOMY      SHOULDER ARTHROSCOPY      SHOULDER ARTHROSCOPY W/ SUPERIOR LABRAL ANTERIOR POSTERIOR LESION REPAIR Left 11/12/2019    Procedure: ARTHROSCOPY, SHOULDER;  Surgeon: Aston Lara Jr., MD;  Location: [facility];  Service: Orthopedics;  Laterality: Left;       Current Outpatient Medications   Medication Sig    amLODIPine (NORVASC) 5 MG tablet Take 5 mg by mouth once daily.    aspirin (ECOTRIN) 81 MG EC tablet Take 81 mg by mouth once daily.    doxycycline (VIBRA-TABS) 100 MG tablet Take 1 tablet by mouth.    lisinopril (PRINIVIL,ZESTRIL) 20 MG tablet Take 20 mg by mouth once daily.    oxybutynin (DITROPAN-XL) 10 MG 24 hr tablet Take 1 tablet by mouth.    promethazine (PHENERGAN) 25 MG tablet Take 1 tablet (25 mg total) by mouth every 8 (eight) hours as needed for Nausea.    venlafaxine (EFFEXOR) 75 MG tablet Take 1 tablet by mouth.     No current facility-administered medications for this visit.        Review of patient's allergies indicates:   Allergen Reactions    Codeine      Other reaction(s): Hives       History reviewed. No pertinent family history.    Social History     Socioeconomic History    Marital status:      Spouse name: Not on file    Number of children: Not on file    Years of education: Not on file    Highest education level: Not on file   Occupational History    Not on file   Social Needs    Financial resource  strain: Not on file    Food insecurity:     Worry: Not on file     Inability: Not on file    Transportation needs:     Medical: Not on file     Non-medical: Not on file   Tobacco Use    Smoking status: Former Smoker     Packs/day: 1.00     Types: Cigarettes     Last attempt to quit: 2011     Years since quittin.1    Smokeless tobacco: Never Used   Substance and Sexual Activity    Alcohol use: Yes     Comment: Socially    Drug use: Never    Sexual activity: Not on file   Lifestyle    Physical activity:     Days per week: Not on file     Minutes per session: Not on file    Stress: Not on file   Relationships    Social connections:     Talks on phone: Not on file     Gets together: Not on file     Attends Yazidi service: Not on file     Active member of club or organization: Not on file     Attends meetings of clubs or organizations: Not on file     Relationship status: Not on file   Other Topics Concern    Not on file   Social History Narrative    Not on file       History of present illness:  Follow-up left shoulder labral repair for instability 3 months.  She has been going to therapy.  She is doing well at this point has completed her therapy and is happy with the shoulder.    Review of Systems:    Musculoskeletal:  See HPI      Objective:        Physical Examination:    Vital Signs:    Vitals:    02/10/20 0800   BP: (!) 146/86   Pulse: 72       Body mass index is 27.25 kg/m².    This a well-developed, well nourished patient in no acute distress.  They are alert and oriented and cooperative to examination.        Left shoulder shows excellent forward flexion no painful arc minimally stiff with external rotation and internal rotation she lacks may be 25% of internal and external rotation max.  Very smooth.  No apprehension pain  Pertinent New Results:    XRAY Report / Interpretation:   AP x-ray left shoulder shows the multiple lucencies in the glenoid consistent with radiolucent anchors.   Subdeltoid space looks normal. No metallic hardware.  Signature    Assessment/Plan:      Impression 3 months after labral repair she is happy with the range of motion this point is exactly where we wanted to be with minimal loss of rotation stable shoulder no pain she will complete the rest of her therapy at home and is expected to slowly stretch it out a little further.  The motion at this point is right where we want to be.  Resume normal activities.  See her back p.r.n.    This note was created using Dragon voice recognition software that occasionally misinterpreted phrases or words.

## 2021-02-24 ENCOUNTER — IMMUNIZATION (OUTPATIENT)
Dept: INTERNAL MEDICINE | Facility: CLINIC | Age: 61
End: 2021-02-24
Payer: COMMERCIAL

## 2021-02-24 DIAGNOSIS — Z23 NEED FOR VACCINATION: Primary | ICD-10-CM

## 2021-02-24 PROCEDURE — 91300 COVID-19, MRNA, LNP-S, PF, 30 MCG/0.3 ML DOSE VACCINE: ICD-10-PCS | Mod: S$GLB,,, | Performed by: INTERNAL MEDICINE

## 2021-02-24 PROCEDURE — 0001A COVID-19, MRNA, LNP-S, PF, 30 MCG/0.3 ML DOSE VACCINE: ICD-10-PCS | Mod: CV19,S$GLB,, | Performed by: INTERNAL MEDICINE

## 2021-02-24 PROCEDURE — 91300 COVID-19, MRNA, LNP-S, PF, 30 MCG/0.3 ML DOSE VACCINE: CPT | Mod: S$GLB,,, | Performed by: INTERNAL MEDICINE

## 2021-02-24 PROCEDURE — 0001A COVID-19, MRNA, LNP-S, PF, 30 MCG/0.3 ML DOSE VACCINE: CPT | Mod: CV19,S$GLB,, | Performed by: INTERNAL MEDICINE

## 2021-03-17 ENCOUNTER — IMMUNIZATION (OUTPATIENT)
Dept: INTERNAL MEDICINE | Facility: CLINIC | Age: 61
End: 2021-03-17
Payer: COMMERCIAL

## 2021-03-17 DIAGNOSIS — Z23 NEED FOR VACCINATION: Primary | ICD-10-CM

## 2021-03-17 PROCEDURE — 91300 COVID-19, MRNA, LNP-S, PF, 30 MCG/0.3 ML DOSE VACCINE: CPT | Mod: PBBFAC | Performed by: INTERNAL MEDICINE

## 2021-03-17 PROCEDURE — 0002A COVID-19, MRNA, LNP-S, PF, 30 MCG/0.3 ML DOSE VACCINE: CPT | Mod: PBBFAC | Performed by: INTERNAL MEDICINE

## 2021-12-08 ENCOUNTER — HOSPITAL ENCOUNTER (OUTPATIENT)
Dept: PREADMISSION TESTING | Facility: HOSPITAL | Age: 61
Discharge: HOME | End: 2021-12-08
Attending: INTERNAL MEDICINE
Payer: COMMERCIAL

## 2021-12-08 VITALS
DIASTOLIC BLOOD PRESSURE: 96 MMHG | HEART RATE: 64 BPM | RESPIRATION RATE: 16 BRPM | OXYGEN SATURATION: 97 % | WEIGHT: 149 LBS | TEMPERATURE: 98 F | BODY MASS INDEX: 27.42 KG/M2 | SYSTOLIC BLOOD PRESSURE: 161 MMHG | HEIGHT: 62 IN

## 2021-12-08 DIAGNOSIS — Z01.818 PREOP TESTING: Primary | ICD-10-CM

## 2021-12-08 LAB
ANION GAP SERPL CALC-SCNC: 9 MMOL/L (ref 8–16)
BASOPHILS # BLD AUTO: 0.04 K/UL (ref 0–0.2)
BASOPHILS NFR BLD: 0.5 % (ref 0–1.9)
BUN SERPL-MCNC: 11 MG/DL (ref 6–20)
CALCIUM SERPL-MCNC: 9.3 MG/DL (ref 8.7–10.5)
CHLORIDE SERPL-SCNC: 100 MMOL/L (ref 95–110)
CO2 SERPL-SCNC: 28 MMOL/L (ref 23–29)
CREAT SERPL-MCNC: 0.6 MG/DL (ref 0.5–1.4)
DIFFERENTIAL METHOD: NORMAL
EOSINOPHIL # BLD AUTO: 0.1 K/UL (ref 0–0.5)
EOSINOPHIL NFR BLD: 1.9 % (ref 0–8)
ERYTHROCYTE [DISTWIDTH] IN BLOOD BY AUTOMATED COUNT: 12.9 % (ref 11.5–14.5)
EST. GFR  (AFRICAN AMERICAN): >60 ML/MIN/1.73 M^2
EST. GFR  (NON AFRICAN AMERICAN): >60 ML/MIN/1.73 M^2
GLUCOSE SERPL-MCNC: 109 MG/DL (ref 70–110)
HCT VFR BLD AUTO: 43 % (ref 37–48.5)
HGB BLD-MCNC: 14.1 G/DL (ref 12–16)
IMM GRANULOCYTES # BLD AUTO: 0.02 K/UL (ref 0–0.04)
IMM GRANULOCYTES NFR BLD AUTO: 0.3 % (ref 0–0.5)
LYMPHOCYTES # BLD AUTO: 2.5 K/UL (ref 1–4.8)
LYMPHOCYTES NFR BLD: 33.7 % (ref 18–48)
MCH RBC QN AUTO: 29.1 PG (ref 27–31)
MCHC RBC AUTO-ENTMCNC: 32.8 G/DL (ref 32–36)
MCV RBC AUTO: 89 FL (ref 82–98)
MONOCYTES # BLD AUTO: 0.7 K/UL (ref 0.3–1)
MONOCYTES NFR BLD: 9.9 % (ref 4–15)
NEUTROPHILS # BLD AUTO: 3.9 K/UL (ref 1.8–7.7)
NEUTROPHILS NFR BLD: 53.7 % (ref 38–73)
NRBC BLD-RTO: 0 /100 WBC
PLATELET # BLD AUTO: 337 K/UL (ref 150–450)
PMV BLD AUTO: 9.5 FL (ref 9.2–12.9)
POTASSIUM SERPL-SCNC: 4.4 MMOL/L (ref 3.5–5.1)
RBC # BLD AUTO: 4.85 M/UL (ref 4–5.4)
SARS-COV-2 RDRP RESP QL NAA+PROBE: NEGATIVE
SODIUM SERPL-SCNC: 137 MMOL/L (ref 136–145)
WBC # BLD AUTO: 7.28 K/UL (ref 3.9–12.7)

## 2021-12-08 PROCEDURE — U0002 COVID-19 LAB TEST NON-CDC: HCPCS | Performed by: STUDENT IN AN ORGANIZED HEALTH CARE EDUCATION/TRAINING PROGRAM

## 2021-12-08 PROCEDURE — 36415 COLL VENOUS BLD VENIPUNCTURE: CPT | Performed by: STUDENT IN AN ORGANIZED HEALTH CARE EDUCATION/TRAINING PROGRAM

## 2021-12-08 PROCEDURE — 80048 BASIC METABOLIC PNL TOTAL CA: CPT | Performed by: STUDENT IN AN ORGANIZED HEALTH CARE EDUCATION/TRAINING PROGRAM

## 2021-12-08 PROCEDURE — 85025 COMPLETE CBC W/AUTO DIFF WBC: CPT | Performed by: STUDENT IN AN ORGANIZED HEALTH CARE EDUCATION/TRAINING PROGRAM

## 2021-12-09 ENCOUNTER — ANESTHESIA EVENT (OUTPATIENT)
Dept: SURGERY | Facility: HOSPITAL | Age: 61
End: 2021-12-09
Payer: COMMERCIAL

## 2021-12-10 ENCOUNTER — ANESTHESIA (OUTPATIENT)
Dept: SURGERY | Facility: HOSPITAL | Age: 61
End: 2021-12-10
Payer: COMMERCIAL

## 2021-12-10 ENCOUNTER — HOSPITAL ENCOUNTER (OUTPATIENT)
Facility: HOSPITAL | Age: 61
Discharge: HOME | End: 2021-12-10
Attending: INTERNAL MEDICINE | Admitting: INTERNAL MEDICINE
Payer: COMMERCIAL

## 2021-12-10 VITALS
OXYGEN SATURATION: 99 % | TEMPERATURE: 98 F | SYSTOLIC BLOOD PRESSURE: 153 MMHG | RESPIRATION RATE: 18 BRPM | DIASTOLIC BLOOD PRESSURE: 69 MMHG | HEART RATE: 75 BPM

## 2021-12-10 DIAGNOSIS — Z85.038 PERSONAL HISTORY OF OTHER MALIGNANT NEOPLASM OF LARGE INTESTINE: ICD-10-CM

## 2021-12-10 DIAGNOSIS — R63.4 ABNORMAL WEIGHT LOSS: Primary | ICD-10-CM

## 2021-12-10 DIAGNOSIS — Z01.818 PREOP TESTING: ICD-10-CM

## 2021-12-10 PROCEDURE — 93010 EKG 12-LEAD: ICD-10-PCS | Mod: ,,, | Performed by: INTERNAL MEDICINE

## 2021-12-10 PROCEDURE — 27000284 HC CANNULA NASAL: Performed by: ANESTHESIOLOGY

## 2021-12-10 PROCEDURE — 93005 ELECTROCARDIOGRAM TRACING: CPT | Mod: 59 | Performed by: INTERNAL MEDICINE

## 2021-12-10 PROCEDURE — 37000009 HC ANESTHESIA EA ADD 15 MINS: Performed by: INTERNAL MEDICINE

## 2021-12-10 PROCEDURE — 45380 COLONOSCOPY AND BIOPSY: CPT | Performed by: INTERNAL MEDICINE

## 2021-12-10 PROCEDURE — 25000003 PHARM REV CODE 250: Performed by: INTERNAL MEDICINE

## 2021-12-10 PROCEDURE — 27200043 HC FORCEPS, BIOPSY: Performed by: INTERNAL MEDICINE

## 2021-12-10 PROCEDURE — 25000003 PHARM REV CODE 250: Performed by: NURSE ANESTHETIST, CERTIFIED REGISTERED

## 2021-12-10 PROCEDURE — 37000008 HC ANESTHESIA 1ST 15 MINUTES: Performed by: INTERNAL MEDICINE

## 2021-12-10 PROCEDURE — 93010 ELECTROCARDIOGRAM REPORT: CPT | Mod: ,,, | Performed by: INTERNAL MEDICINE

## 2021-12-10 PROCEDURE — 27000654 HC CATH IV JELCO: Performed by: ANESTHESIOLOGY

## 2021-12-10 PROCEDURE — 63600175 PHARM REV CODE 636 W HCPCS: Performed by: NURSE ANESTHETIST, CERTIFIED REGISTERED

## 2021-12-10 RX ORDER — PROPOFOL 10 MG/ML
VIAL (ML) INTRAVENOUS
Status: DISCONTINUED | OUTPATIENT
Start: 2021-12-10 | End: 2021-12-10

## 2021-12-10 RX ADMIN — SODIUM CHLORIDE: 0.9 INJECTION, SOLUTION INTRAVENOUS at 10:12

## 2021-12-10 RX ADMIN — SIMETHICONE 40 MG: 20 SUSPENSION/ DROPS ORAL at 10:12

## 2021-12-10 RX ADMIN — PROPOFOL 380 MG: 10 INJECTION, EMULSION INTRAVENOUS at 10:12

## 2021-12-27 ENCOUNTER — HOSPITAL ENCOUNTER (OUTPATIENT)
Dept: RADIOLOGY | Facility: HOSPITAL | Age: 61
Discharge: HOME | End: 2021-12-27
Attending: INTERNAL MEDICINE
Payer: COMMERCIAL

## 2021-12-27 DIAGNOSIS — R63.4 ABNORMAL WEIGHT LOSS: ICD-10-CM

## 2021-12-27 PROCEDURE — 25500020 PHARM REV CODE 255: Performed by: INTERNAL MEDICINE

## 2021-12-27 PROCEDURE — 74178 CT ABD&PLV WO CNTR FLWD CNTR: CPT | Mod: TC

## 2021-12-27 RX ADMIN — IOHEXOL 100 ML: 350 INJECTION, SOLUTION INTRAVENOUS at 09:12

## 2022-03-01 ENCOUNTER — TELEPHONE (OUTPATIENT)
Dept: HEMATOLOGY/ONCOLOGY | Facility: CLINIC | Age: 62
End: 2022-03-01
Payer: COMMERCIAL

## 2022-08-25 PROBLEM — Z90.49 S/P RIGHT HEMICOLECTOMY: Status: ACTIVE | Noted: 2022-08-25

## 2022-08-25 PROBLEM — C18.2 MALIGNANT NEOPLASM OF ASCENDING COLON: Status: ACTIVE | Noted: 2022-08-25

## 2022-08-25 PROBLEM — C18.9 COLON CANCER: Status: ACTIVE | Noted: 2022-08-25

## 2022-08-26 ENCOUNTER — OFFICE VISIT (OUTPATIENT)
Dept: HEMATOLOGY/ONCOLOGY | Facility: CLINIC | Age: 62
End: 2022-08-26
Payer: COMMERCIAL

## 2022-08-26 VITALS
HEART RATE: 78 BPM | SYSTOLIC BLOOD PRESSURE: 153 MMHG | TEMPERATURE: 98 F | WEIGHT: 149 LBS | DIASTOLIC BLOOD PRESSURE: 70 MMHG | BODY MASS INDEX: 27.25 KG/M2

## 2022-08-26 DIAGNOSIS — Z90.49 S/P RIGHT HEMICOLECTOMY: ICD-10-CM

## 2022-08-26 DIAGNOSIS — R52 GENERALIZED BODY ACHES: Primary | ICD-10-CM

## 2022-08-26 DIAGNOSIS — R52 GENERALIZED PAIN: ICD-10-CM

## 2022-08-26 DIAGNOSIS — C18.2 MALIGNANT NEOPLASM OF ASCENDING COLON: ICD-10-CM

## 2022-08-26 PROCEDURE — 4010F ACE/ARB THERAPY RXD/TAKEN: CPT | Mod: CPTII,S$GLB,, | Performed by: INTERNAL MEDICINE

## 2022-08-26 PROCEDURE — 1159F PR MEDICATION LIST DOCUMENTED IN MEDICAL RECORD: ICD-10-PCS | Mod: CPTII,S$GLB,, | Performed by: INTERNAL MEDICINE

## 2022-08-26 PROCEDURE — 3008F PR BODY MASS INDEX (BMI) DOCUMENTED: ICD-10-PCS | Mod: CPTII,S$GLB,, | Performed by: INTERNAL MEDICINE

## 2022-08-26 PROCEDURE — 3078F PR MOST RECENT DIASTOLIC BLOOD PRESSURE < 80 MM HG: ICD-10-PCS | Mod: CPTII,S$GLB,, | Performed by: INTERNAL MEDICINE

## 2022-08-26 PROCEDURE — 3078F DIAST BP <80 MM HG: CPT | Mod: CPTII,S$GLB,, | Performed by: INTERNAL MEDICINE

## 2022-08-26 PROCEDURE — 3077F PR MOST RECENT SYSTOLIC BLOOD PRESSURE >= 140 MM HG: ICD-10-PCS | Mod: CPTII,S$GLB,, | Performed by: INTERNAL MEDICINE

## 2022-08-26 PROCEDURE — 4010F PR ACE/ARB THEARPY RXD/TAKEN: ICD-10-PCS | Mod: CPTII,S$GLB,, | Performed by: INTERNAL MEDICINE

## 2022-08-26 PROCEDURE — 3077F SYST BP >= 140 MM HG: CPT | Mod: CPTII,S$GLB,, | Performed by: INTERNAL MEDICINE

## 2022-08-26 PROCEDURE — 1159F MED LIST DOCD IN RCRD: CPT | Mod: CPTII,S$GLB,, | Performed by: INTERNAL MEDICINE

## 2022-08-26 PROCEDURE — 99204 PR OFFICE/OUTPT VISIT, NEW, LEVL IV, 45-59 MIN: ICD-10-PCS | Mod: S$GLB,,, | Performed by: INTERNAL MEDICINE

## 2022-08-26 PROCEDURE — 3008F BODY MASS INDEX DOCD: CPT | Mod: CPTII,S$GLB,, | Performed by: INTERNAL MEDICINE

## 2022-08-26 PROCEDURE — 99204 OFFICE O/P NEW MOD 45 MIN: CPT | Mod: S$GLB,,, | Performed by: INTERNAL MEDICINE

## 2022-08-26 NOTE — PROGRESS NOTES
[facility] Hematolgy/Oncology  History & Physical    Subjective:      Patient ID:   NAME: Tammy Peterson : 1960     62 y.o. female    Referring Doc: Bret  Other Physicians: CHAVEZ Barr        Chief Complaint: colon cancer      HPI:  62 y.o. female with diagnosis of colon cancer who has been referred by Dr Antonio Queen with GI for evaluation by medical hematology/oncology. She is here by herself. Patient was diagnosed with colon cancer in 2011 in [place], [place]. She had stage 3B colon cancer in 2011 in [place], [place]. She has colon resection followed by 12 rounds of chemotherapy with FOLFOX.     She underwent right sided colon resection on 2011 with pathology reporting a G2 colonic adenocarcinoma 6cm in dimension invading the full thickness of the ascending colon and into the pericolonic adipose tissue and subserosa. The pathology was negative for vascular invasion but she had 4 out of 18 LN's that were positive for cancer. The distal and proximal margins were negative but the serosal margin was within <1cm.       She had portacath on left CW which was since removed.     She has some generalized aches and pains. Weight is fairly stable. Bowels are moving normally. 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     Discussed covid 19 precautions - she has been vaccinated but no boosters              ROS:   GEN: normal without any fever, night sweats or weight loss; generalized aches and pains  HEENT: normal with no HA's, sore throat, stiff neck, changes in vision  CV: normal with no CP, SOB, PND, CAVAZOS or orthopnea  PULM: normal with no SOB, cough, hemoptysis, sputum or pleuritic pain  GI: normal with no abdominal pain, nausea, vomiting, constipation, diarrhea, melanotic stools, BRBPR, or hematemesis  : normal with no hematuria, dysuria  BREAST: normal with no  mass, discharge, pain  SKIN: normal with no rash, erythema, bruising, or swelling       Past Medical/Surgical History:  Past Medical History:   Diagnosis Date    Cancer 02/2111    colon    Colon cancer 8/25/2022    Hypertension     Malignant neoplasm of ascending colon (Feb 2011 - stage III adenocarcinoma with 4 out of 18 LN's positive) 8/25/2022    S/P right hemicolectomy 8/25/2022     Past Surgical History:   Procedure Laterality Date    CHOLECYSTECTOMY      COLON SURGERY  02/2011    colectomy    COLONOSCOPY N/A 12/10/2021    Procedure: COLONOSCOPY;  Surgeon: Antonio Queen MD;  Location: [facility] ENDO;  Service: Endoscopy;  Laterality: N/A;    HEMICOLECTOMY      HERNIA REPAIR Bilateral 09/2019    [facility]    HYSTERECTOMY      SHOULDER ARTHROSCOPY      SHOULDER ARTHROSCOPY W/ SUPERIOR LABRAL ANTERIOR POSTERIOR LESION REPAIR Left 11/12/2019    Procedure: ARTHROSCOPY, SHOULDER;  Surgeon: Aston Lara Jr., MD;  Location: [facility] OR;  Service: Orthopedics;  Laterality: Left;         Allergies:  Review of patient's allergies indicates:   Allergen Reactions    Codeine      Other reaction(s): Hives       Social/Family History:  Social History     Socioeconomic History    Marital status:    Tobacco Use    Smoking status: Former Smoker     Packs/day: 1.00     Types: Cigarettes     Quit date: 12/26/2011     Years since quitting: 10.6    Smokeless tobacco: Never Used   Substance and Sexual Activity    Alcohol use: Yes     Comment: Socially    Drug use: Never     No family history on file.      Medications:    Current Outpatient Medications:     amLODIPine (NORVASC) 5 MG tablet, Take 5 mg by mouth once daily., Disp: , Rfl: 4    aspirin (ECOTRIN) 81 MG EC tablet, Take 81 mg by mouth once daily., Disp: , Rfl:     doxycycline (VIBRA-TABS) 100 MG tablet, Take 1 tablet by mouth., Disp: , Rfl:     lisinopril (PRINIVIL,ZESTRIL) 20 MG tablet, Take 20 mg by mouth once daily., Disp: , Rfl:     oxybutynin  (DITROPAN-XL) 10 MG 24 hr tablet, Take 1 tablet by mouth., Disp: , Rfl:     venlafaxine (EFFEXOR) 75 MG tablet, Take 1 tablet by mouth., Disp: , Rfl:       Pathology:  Cancer Staging  No matching staging information was found for the patient.    Colonoscopy 12/10/2021:    SIGMOID COLON POLYP:   - TUBULAR ADENOMA.   - NO HIGH GRADE DYSPLASIA OR MALIGNANCY IS IDENTIFIED.     Right hemicolectomy Feb 22, 2011:                Objective:   Vitals:  There were no vitals taken for this visit.    Physical Examination:   GEN: no apparent distress, comfortable; AAOx3  HEAD: atraumatic and normocephalic  EYES: no pallor, no icterus, PERRLA  ENT: OMM, no pharyngeal erythema, external ears WNL; no nasal discharge; no thrush  NECK: no masses, thyroid normal, trachea midline, no LAD/LN's, supple  CV: RRR with no murmur; normal pulse; normal S1 and S2; no pedal edema  CHEST: Normal respiratory effort; CTAB; normal breath sounds; no wheeze or crackles  ABDOM: nontender and nondistended; soft; normal bowel sounds; no rebound/guarding; s/p colon resection and two hernia repairs  MUSC/Skeletal: ROM normal; no crepitus; joints normal; no deformities or arthropathy  EXTREM: no clubbing, cyanosis, inflammation or swelling  SKIN: no rashes, lesions, ulcers, petechiae or subcutaneous nodules; tattoos  : no laureano  NEURO: grossly intact; motor/sensory WNL; AAOx3; no tremors  PSYCH: normal mood, affect and behavior  LYMPH: normal cervical, supraclavicular, axillary and groin LN's      Labs:   Lab Results   Component Value Date    WBC 7.28 12/08/2021    HGB 14.1 12/08/2021    HCT 43.0 12/08/2021    MCV 89 12/08/2021     12/08/2021    CMP  Sodium   Date Value Ref Range Status   12/08/2021 137 136 - 145 mmol/L Final     Potassium   Date Value Ref Range Status   12/08/2021 4.4 3.5 - 5.1 mmol/L Final     Chloride   Date Value Ref Range Status   12/08/2021 100 95 - 110 mmol/L Final     CO2   Date Value Ref Range Status   12/08/2021 28 23 -  29 mmol/L Final     Glucose   Date Value Ref Range Status   12/08/2021 109 70 - 110 mg/dL Final     BUN   Date Value Ref Range Status   12/08/2021 11 6 - 20 mg/dL Final     Creatinine   Date Value Ref Range Status   12/08/2021 0.6 0.5 - 1.4 mg/dL Final     Calcium   Date Value Ref Range Status   12/08/2021 9.3 8.7 - 10.5 mg/dL Final     Total Protein   Date Value Ref Range Status   06/18/2008 6.1 6.0 - 8.4 gm/dl Final     Albumin   Date Value Ref Range Status   06/18/2008 4.1 3.5 - 5.2 g/dl Final     Total Bilirubin   Date Value Ref Range Status   06/18/2008 0.2 0.1 - 1.0 mg/dl Final     Comment:     For infants and newborns, interpretation of results should be based  on gestational age, weight and in agreement with clinical  observations.  .  Premature Infant recommended reference ranges:  Up to 24 hours.............<8.0 mg/dl  Up to 48 hours............<12.0 mg/dl  3-5 days..................<15.0 mg/dl  6-29 days.................<15.0 mg/dl       Alkaline Phosphatase   Date Value Ref Range Status   06/18/2008 46 (L) 55 - 135 U/L Final     AST   Date Value Ref Range Status   06/18/2008 13 10 - 40 U/L Final     ALT   Date Value Ref Range Status   06/18/2008 13 10 - 44 U/L Final     Anion Gap   Date Value Ref Range Status   12/08/2021 9 8 - 16 mmol/L Final     eGFR if    Date Value Ref Range Status   12/08/2021 >60.0 >60 mL/min/1.73 m^2 Final     eGFR if non    Date Value Ref Range Status   12/08/2021 >60.0 >60 mL/min/1.73 m^2 Final     Comment:     Calculation used to obtain the estimated glomerular filtration  rate (eGFR) is the CKD-EPI equation.            Radiology/Diagnostic Studies:    none      All lab results and imaging results have been reviewed and discussed with the patient    Assessment:   (1) 62 y.o. female with diagnosis of colon cancer who has been referred by Dr Antonio Queen with GI for evaluation by medical hematology/oncology. Patient was diagnosed with colon cancer  in Feb 2011 in [place], [place].       - She had stage 3B colon cancer in Feb 2011 in [place], [place]. She has colon resection followed by 12 rounds of chemotherapy with FOLFOX.     - She underwent right sided colon resection on 2/22/2011 with pathology reporting a G2 colonic adenocarcinoma 6cm in dimension invading the full thickness of the ascending colon and into the pericolonic adipose tissue and subserosa. The pathology was negative for vascular invasion but she had 4 out of 18 LN's that were positive for cancer. The distal and proximal margins were negative but the serosal margin was within <1cm.   - she was followed by [facility]    - she had recent colonoscopy in Dec 2021 with Dr Queen which just showed a tubular adenoma    (2) HTN    (3) Seborrheic keratosis of scalp    (4) Former smoker (quit 2011)    VISIT DIAGNOSES:              Malignant neoplasm of ascending colon    S/P right hemicolectomy            Plan:     PLAN:  1. Check up to date labs and PEt scan  2. F/u with PCP and GI  3. Check CEA every 6 months  4. RTC in 4 weeks  Fax note to Mario Barr      COVID-19 Discussion:    I had long discussion with patient and any applicable family about the COVID-19 coronavirus epidemic and the recommended precautions with regard to cancer and/or hematology patients. I have re-iterated the CDC recommendations for adequate hand washing, use of hand -like products, and coughing into elbow, etc. In addition, especially for our patients who are on chemotherapy and/or our otherwise immunocompromised patients, I have recommended avoidance of crowds, including movie theaters, restaurants, churches, etc. I have recommended avoidance of any sick or symptomatic family members and/or friends. I have also recommended avoidance of any raw and unwashed food products, and general avoidance of food items that have not been prepared by themselves. The patient has been asked to call us immediately with any  "symptom developments, issues, questions or other general concerns.       Pathology Discussion:    I reviewed and discussed the pathology report(s) and radiograph reports (if available) in as simple to understand and/or laymen's terms to the best of my ability. I had an indepth conversation with the patient and went over the patient's individual diagnosis based on the information that was currently available. I discussed the TNM staging process with regard to the patient's particular cancer type, and the calculated stage based on the currently available TNM data and literature. I discussed the available prognostic data with regard to the current staging information and how it relates to the prognosis of their particular neoplastic process.          NCCN Guidelines:    I discussed the available treatment option(s) in accordance with the latest literature from the NCCN Clinical Practice Guidelines for the patient's particular type of cancer disorder. The NCCN Guidelines provide a "document evidence-based (and) consensus-driven management" of the care of oncology patients. The treatment recommendations were made not only in accordance to the NCCN guidelines, but also factored in to account the patient's overall age, condition, performance status and their medical co-morbidities. I went over the risks and benefits of the the treatment options (if any could be made) with regard to their particular cancer type, their cancer stage, their age, and their co-morbidities.     I have explained and the patient understands all of  the current recommendation(s). I have answered all of their questions to the best of my ability and to their complete satisfaction.             Thank you for allowing me to participate in this patient's care. Please call with any questions or concerns.    Electronically signed Eder Camacho MD    Answers for HPI/ROS submitted by the patient on 8/25/2022  appetite change : Yes  unexpected weight " change: No  mouth sores: No  visual disturbance: Yes  cough: No  shortness of breath: No  chest pain: No  abdominal pain: Yes  diarrhea: No  frequency: Yes  back pain: No  rash: No  headaches: No  adenopathy: No  nervous/ anxious: No

## 2022-08-29 ENCOUNTER — LAB VISIT (OUTPATIENT)
Dept: LAB | Facility: HOSPITAL | Age: 62
End: 2022-08-29
Attending: INTERNAL MEDICINE
Payer: COMMERCIAL

## 2022-08-29 DIAGNOSIS — C18.2 MALIGNANT NEOPLASM OF ASCENDING COLON: ICD-10-CM

## 2022-08-29 DIAGNOSIS — Z90.49 S/P RIGHT HEMICOLECTOMY: ICD-10-CM

## 2022-08-29 DIAGNOSIS — R52 GENERALIZED BODY ACHES: ICD-10-CM

## 2022-08-29 DIAGNOSIS — R52 GENERALIZED PAIN: ICD-10-CM

## 2022-08-29 LAB
ALBUMIN SERPL BCP-MCNC: 3.5 G/DL (ref 3.5–5.2)
ALP SERPL-CCNC: 44 U/L (ref 55–135)
ALT SERPL W/O P-5'-P-CCNC: 16 U/L (ref 10–44)
ANION GAP SERPL CALC-SCNC: 10 MMOL/L (ref 8–16)
AST SERPL-CCNC: 20 U/L (ref 10–40)
BASOPHILS # BLD AUTO: 0.05 K/UL (ref 0–0.2)
BASOPHILS NFR BLD: 0.9 % (ref 0–1.9)
BILIRUB SERPL-MCNC: 0.5 MG/DL (ref 0.1–1)
BUN SERPL-MCNC: 13 MG/DL (ref 8–23)
CALCIUM SERPL-MCNC: 9.2 MG/DL (ref 8.7–10.5)
CEA SERPL-MCNC: 5.8 NG/ML (ref 0–5)
CHLORIDE SERPL-SCNC: 101 MMOL/L (ref 95–110)
CO2 SERPL-SCNC: 28 MMOL/L (ref 23–29)
CREAT SERPL-MCNC: 0.6 MG/DL (ref 0.5–1.4)
DIFFERENTIAL METHOD: ABNORMAL
EOSINOPHIL # BLD AUTO: 0.2 K/UL (ref 0–0.5)
EOSINOPHIL NFR BLD: 3.9 % (ref 0–8)
ERYTHROCYTE [DISTWIDTH] IN BLOOD BY AUTOMATED COUNT: 13.1 % (ref 11.5–14.5)
EST. GFR  (NO RACE VARIABLE): >60 ML/MIN/1.73 M^2
GLUCOSE SERPL-MCNC: 119 MG/DL (ref 70–110)
HCT VFR BLD AUTO: 35.4 % (ref 37–48.5)
HGB BLD-MCNC: 11.7 G/DL (ref 12–16)
IMM GRANULOCYTES # BLD AUTO: 0.02 K/UL (ref 0–0.04)
IMM GRANULOCYTES NFR BLD AUTO: 0.4 % (ref 0–0.5)
LYMPHOCYTES # BLD AUTO: 2 K/UL (ref 1–4.8)
LYMPHOCYTES NFR BLD: 38.2 % (ref 18–48)
MCH RBC QN AUTO: 28.7 PG (ref 27–31)
MCHC RBC AUTO-ENTMCNC: 33.1 G/DL (ref 32–36)
MCV RBC AUTO: 87 FL (ref 82–98)
MONOCYTES # BLD AUTO: 0.6 K/UL (ref 0.3–1)
MONOCYTES NFR BLD: 10.5 % (ref 4–15)
NEUTROPHILS # BLD AUTO: 2.5 K/UL (ref 1.8–7.7)
NEUTROPHILS NFR BLD: 46.1 % (ref 38–73)
NRBC BLD-RTO: 0 /100 WBC
PLATELET # BLD AUTO: 295 K/UL (ref 150–450)
PMV BLD AUTO: 9.6 FL (ref 9.2–12.9)
POTASSIUM SERPL-SCNC: 4.6 MMOL/L (ref 3.5–5.1)
PROT SERPL-MCNC: 6.5 G/DL (ref 6–8.4)
RBC # BLD AUTO: 4.07 M/UL (ref 4–5.4)
SODIUM SERPL-SCNC: 139 MMOL/L (ref 136–145)
WBC # BLD AUTO: 5.32 K/UL (ref 3.9–12.7)

## 2022-08-29 PROCEDURE — 85025 COMPLETE CBC W/AUTO DIFF WBC: CPT | Performed by: INTERNAL MEDICINE

## 2022-08-29 PROCEDURE — 80053 COMPREHEN METABOLIC PANEL: CPT | Performed by: INTERNAL MEDICINE

## 2022-08-29 PROCEDURE — 82378 CARCINOEMBRYONIC ANTIGEN: CPT | Performed by: INTERNAL MEDICINE

## 2022-08-29 PROCEDURE — 36415 COLL VENOUS BLD VENIPUNCTURE: CPT | Performed by: INTERNAL MEDICINE

## 2022-09-04 ENCOUNTER — PATIENT MESSAGE (OUTPATIENT)
Dept: HEMATOLOGY/ONCOLOGY | Facility: CLINIC | Age: 62
End: 2022-09-04

## 2022-09-15 ENCOUNTER — HOSPITAL ENCOUNTER (OUTPATIENT)
Dept: RADIOLOGY | Facility: HOSPITAL | Age: 62
Discharge: HOME | End: 2022-09-15
Attending: INTERNAL MEDICINE
Payer: COMMERCIAL

## 2022-09-15 VITALS — HEIGHT: 62 IN | BODY MASS INDEX: 25.76 KG/M2 | WEIGHT: 140 LBS

## 2022-09-15 DIAGNOSIS — Z90.49 S/P RIGHT HEMICOLECTOMY: ICD-10-CM

## 2022-09-15 DIAGNOSIS — C18.2 MALIGNANT NEOPLASM OF ASCENDING COLON: ICD-10-CM

## 2022-09-15 DIAGNOSIS — R52 GENERALIZED BODY ACHES: ICD-10-CM

## 2022-09-15 DIAGNOSIS — R52 GENERALIZED PAIN: ICD-10-CM

## 2022-09-15 LAB — GLUCOSE SERPL-MCNC: 105 MG/DL (ref 70–110)

## 2022-09-15 PROCEDURE — 78815 PET IMAGE W/CT SKULL-THIGH: CPT | Mod: TC,PO

## 2022-11-28 ENCOUNTER — TELEPHONE (OUTPATIENT)
Dept: HEMATOLOGY/ONCOLOGY | Facility: CLINIC | Age: 62
End: 2022-11-28

## 2022-11-30 NOTE — PROGRESS NOTES
"[facility] Hematology/Oncology  PROGRESS NOTE - 2nd Follow-up Visit      Subjective:       Patient ID:   NAME: Tammy Peterson : 1960     62 y.o. female    Referring Doc: Bret  Other Physicians: CHAVEZ Ruiz; Mary Barr           Chief Complaint: colon cancer f/u       History of Present Illness:     Patient returns today for a 2nd regularly scheduled follow-up visit.  The patient is here today to go over the results of the recently ordered labs, tests and studies. She is here by herself. She is doing "great". She had diagnostic mammogram and is getting repeat US in 6 months.     Breathing ok, no CP, SOB, HA' s or N/V    She had PEt on 9/15/2022      Discussed covid 19 precautions - she has been vaccinated but no boosters      ROS:   GEN: normal without any fever, night sweats or weight loss; generalized aches and pains  HEENT: normal with no HA's, sore throat, stiff neck, changes in vision  CV: normal with no CP, SOB, PND, CAVAZOS or orthopnea  PULM: normal with no SOB, cough, hemoptysis, sputum or pleuritic pain  GI: normal with no abdominal pain, nausea, vomiting, constipation, diarrhea, melanotic stools, BRBPR, or hematemesis  : normal with no hematuria, dysuria  BREAST: normal with no mass, discharge, pain  SKIN: normal with no rash, erythema, bruising, or swelling    Pain Scale:  0    Allergies:  Review of patient's allergies indicates:   Allergen Reactions    Codeine      Other reaction(s): Hives       Medications:    Current Outpatient Medications:     amLODIPine (NORVASC) 5 MG tablet, Take 5 mg by mouth once daily., Disp: , Rfl: 4    aspirin (ECOTRIN) 81 MG EC tablet, Take 81 mg by mouth once daily., Disp: , Rfl:     doxycycline (VIBRA-TABS) 100 MG tablet, Take 1 tablet by mouth., Disp: , Rfl:     lisinopril (PRINIVIL,ZESTRIL) 20 MG tablet, Take 20 mg by mouth 2 (two) times a day., Disp: , Rfl:     oxybutynin (DITROPAN-XL) 10 MG 24 hr tablet, Take 1 tablet by mouth 3 (three) times daily., Disp: , Rfl: " "    venlafaxine (EFFEXOR) 75 MG tablet, Take 150 mg by mouth., Disp: , Rfl:     PMHx/PSHx Updates:  See patient's last visit with me on 8/26/2022.  See H&P on 8/26/2022        Pathology:   Cancer Staging   No matching staging information was found for the patient.    Colonoscopy 12/10/2021:     SIGMOID COLON POLYP:   - TUBULAR ADENOMA.   - NO HIGH GRADE DYSPLASIA OR MALIGNANCY IS IDENTIFIED.     Right hemicolectomy Feb 22, 2011:     Objective:     Vitals:  Blood pressure (!) 162/81, pulse 62, temperature 97.9 °F (36.6 °C), resp. rate 18, height 5' 2" (1.575 m), weight 70.9 kg (156 lb 3.2 oz).    Physical Examination:   GEN: no apparent distress, comfortable; AAOx3  HEAD: atraumatic and normocephalic  EYES: no pallor, no icterus, PERRLA  ENT: OMM, no pharyngeal erythema, external ears WNL; no nasal discharge; no thrush  NECK: no masses, thyroid normal, trachea midline, no LAD/LN's, supple  CV: RRR with no murmur; normal pulse; normal S1 and S2; no pedal edema  CHEST: Normal respiratory effort; CTAB; normal breath sounds; no wheeze or crackles  ABDOM: nontender and nondistended; soft; normal bowel sounds; no rebound/guarding; s/p colon resection and two hernia repairs  MUSC/Skeletal: ROM normal; no crepitus; joints normal; no deformities or arthropathy  EXTREM: no clubbing, cyanosis, inflammation or swelling  SKIN: no rashes, lesions, ulcers, petechiae or subcutaneous nodules; tattoos  : no laureano  NEURO: grossly intact; motor/sensory WNL; AAOx3; no tremors  PSYCH: normal mood, affect and behavior  LYMPH: normal cervical, supraclavicular, axillary and groin LN's            Labs:     Lab Results   Component Value Date    WBC 5.32 08/29/2022    HGB 11.7 (L) 08/29/2022    HCT 35.4 (L) 08/29/2022    MCV 87 08/29/2022     08/29/2022       CMP  Sodium   Date Value Ref Range Status   08/29/2022 139 136 - 145 mmol/L Final     Potassium   Date Value Ref Range Status   08/29/2022 4.6 3.5 - 5.1 mmol/L Final     Chloride "   Date Value Ref Range Status   08/29/2022 101 95 - 110 mmol/L Final     CO2   Date Value Ref Range Status   08/29/2022 28 23 - 29 mmol/L Final     Glucose   Date Value Ref Range Status   08/29/2022 119 (H) 70 - 110 mg/dL Final     BUN   Date Value Ref Range Status   08/29/2022 13 8 - 23 mg/dL Final     Creatinine   Date Value Ref Range Status   08/29/2022 0.6 0.5 - 1.4 mg/dL Final     Calcium   Date Value Ref Range Status   08/29/2022 9.2 8.7 - 10.5 mg/dL Final     Total Protein   Date Value Ref Range Status   08/29/2022 6.5 6.0 - 8.4 g/dL Final     Albumin   Date Value Ref Range Status   08/29/2022 3.5 3.5 - 5.2 g/dL Final     Total Bilirubin   Date Value Ref Range Status   08/29/2022 0.5 0.1 - 1.0 mg/dL Final     Comment:     For infants and newborns, interpretation of results should be based  on gestational age, weight and in agreement with clinical  observations.    Premature Infant recommended reference ranges:  Up to 24 hours.............<8.0 mg/dL  Up to 48 hours............<12.0 mg/dL  3-5 days..................<15.0 mg/dL  6-29 days.................<15.0 mg/dL       Alkaline Phosphatase   Date Value Ref Range Status   08/29/2022 44 (L) 55 - 135 U/L Final     AST   Date Value Ref Range Status   08/29/2022 20 10 - 40 U/L Final     ALT   Date Value Ref Range Status   08/29/2022 16 10 - 44 U/L Final     Anion Gap   Date Value Ref Range Status   08/29/2022 10 8 - 16 mmol/L Final     eGFR   Date Value Ref Range Status   08/29/2022 >60.0 >60 mL/min/1.73 m^2 Final           Radiology/Diagnostic Studies:    PET 9/15/2022:    IMPRESSION:  No evidence of recurrent or metastatic disease     Healing fracture of the anterior right third rib     Prior partial right hemicolectomy  Cholecystectomy and hysterectomy  Right renal cysts with nonobstructing renal stones  Stable 1 cm hepatic cyst       I have reviewed all available lab results and radiology reports.    Assessment/Plan:   (1) 62 y.o. female with diagnosis of  colon cancer who has been referred by Dr Antonio Queen with GI for evaluation by medical hematology/oncology. Patient was diagnosed with colon cancer in Feb 2011 in [place], [place].         - She had stage 3B colon cancer in Feb 2011 in [place], [place]. She has colon resection followed by 12 rounds of chemotherapy with FOLFOX.      - She underwent right sided colon resection on 2/22/2011 with pathology reporting a G2 colonic adenocarcinoma 6cm in dimension invading the full thickness of the ascending colon and into the pericolonic adipose tissue and subserosa. The pathology was negative for vascular invasion but she had 4 out of 18 LN's that were positive for cancer. The distal and proximal margins were negative but the serosal margin was within <1cm.   - she was followed by [facility]     - she had recent colonoscopy in Dec 2021 with Dr Queen which just showed a tubular adenoma    12/1/2022:  - PEt on 9/15/2022 evidence of recurrent or metastatic disease  - CEA was 5.8 and chronically elevated but stable  - colonoscopy was in last Dec 2021 - followed by Dr Queen     (2) HTN     (3) Seborrheic keratosis of scalp     (4) Former smoker (quit 2011)       VISIT DIAGNOSES:      Malignant neoplasm of ascending colon (Feb 2011 - stage III adenocarcinoma with 4 out of 18 LN's positive)    S/P right hemicolectomy        PLAN:  1. continue with surveillance  2. F/u with PCP and GI  3. Check CEA every 6 months  4. RTC in 12 months  Fax note to Mario Barr    Discussion:     COVID-19 Discussion:     I had long discussion with patient and any applicable family about the COVID-19 coronavirus epidemic and the recommended precautions with regard to cancer and/or hematology patients. I have re-iterated the CDC recommendations for adequate hand washing, use of hand -like products, and coughing into elbow, etc. In addition, especially for our patients who are on chemotherapy and/or our otherwise immunocompromised  "patients, I have recommended avoidance of crowds, including movie theaters, restaurants, churches, etc. I have recommended avoidance of any sick or symptomatic family members and/or friends. I have also recommended avoidance of any raw and unwashed food products, and general avoidance of food items that have not been prepared by themselves. The patient has been asked to call us immediately with any symptom developments, issues, questions or other general concerns.  I spent over 25 mins of time with the patient. Reviewed results of the recently ordered labs, tests and studies; made directives with regards to the results. Over half of this time was spent couseling and coordinating care.    Pathology Discussion:     I reviewed and discussed the pathology report(s) and radiograph reports (if available) in as simple to understand and/or laymen's terms to the best of my ability. I had an indepth conversation with the patient and went over the patient's individual diagnosis based on the information that was currently available. I discussed the TNM staging process with regard to the patient's particular cancer type, and the calculated stage based on the currently available TNM data and literature. I discussed the available prognostic data with regard to the current staging information and how it relates to the prognosis of their particular neoplastic process.             NCCN Guidelines:     I discussed the available treatment option(s) in accordance with the latest literature from the NCCN Clinical Practice Guidelines for the patient's particular type of cancer disorder. The NCCN Guidelines provide a "document evidence-based (and) consensus-driven management" of the care of oncology patients. The treatment recommendations were made not only in accordance to the NCCN guidelines, but also factored in to account the patient's overall age, condition, performance status and their medical co-morbidities. I went over the risks " and benefits of the the treatment options (if any could be made) with regard to their particular cancer type, their cancer stage, their age, and their co-morbidities.      I have explained and the patient understands all of  the current recommendation(s). I have answered all of their questions to the best of my ability and to their complete satisfaction.                  I have explained all of the above in detail and the patient understands all of the current recommendation(s). I have answered all of their questions to the best of my ability and to their complete satisfaction.   The patient is to continue with the current management plan.            Electronically signed by Eder Camacho MD          Answers submitted by the patient for this visit:  Review of Systems Questionnaire (Submitted on 11/28/2022)  appetite change : No  unexpected weight change: No  mouth sores: No  visual disturbance: No  cough: No  shortness of breath: No  chest pain: No  abdominal pain: No  diarrhea: No  frequency: Yes  back pain: No  rash: No  headaches: No  adenopathy: No  nervous/ anxious: Yes

## 2022-12-01 ENCOUNTER — OFFICE VISIT (OUTPATIENT)
Dept: HEMATOLOGY/ONCOLOGY | Facility: CLINIC | Age: 62
End: 2022-12-01
Payer: COMMERCIAL

## 2022-12-01 VITALS
HEART RATE: 62 BPM | SYSTOLIC BLOOD PRESSURE: 162 MMHG | WEIGHT: 156.19 LBS | BODY MASS INDEX: 28.74 KG/M2 | HEIGHT: 62 IN | TEMPERATURE: 98 F | RESPIRATION RATE: 18 BRPM | DIASTOLIC BLOOD PRESSURE: 81 MMHG

## 2022-12-01 DIAGNOSIS — C18.2 MALIGNANT NEOPLASM OF ASCENDING COLON: Primary | ICD-10-CM

## 2022-12-01 DIAGNOSIS — Z90.49 S/P RIGHT HEMICOLECTOMY: ICD-10-CM

## 2022-12-01 PROCEDURE — 3079F PR MOST RECENT DIASTOLIC BLOOD PRESSURE 80-89 MM HG: ICD-10-PCS | Mod: CPTII,S$GLB,, | Performed by: INTERNAL MEDICINE

## 2022-12-01 PROCEDURE — 1159F PR MEDICATION LIST DOCUMENTED IN MEDICAL RECORD: ICD-10-PCS | Mod: CPTII,S$GLB,, | Performed by: INTERNAL MEDICINE

## 2022-12-01 PROCEDURE — 1159F MED LIST DOCD IN RCRD: CPT | Mod: CPTII,S$GLB,, | Performed by: INTERNAL MEDICINE

## 2022-12-01 PROCEDURE — 99215 PR OFFICE/OUTPT VISIT, EST, LEVL V, 40-54 MIN: ICD-10-PCS | Mod: S$GLB,,, | Performed by: INTERNAL MEDICINE

## 2022-12-01 PROCEDURE — 99215 OFFICE O/P EST HI 40 MIN: CPT | Mod: S$GLB,,, | Performed by: INTERNAL MEDICINE

## 2022-12-01 PROCEDURE — 4010F ACE/ARB THERAPY RXD/TAKEN: CPT | Mod: CPTII,S$GLB,, | Performed by: INTERNAL MEDICINE

## 2022-12-01 PROCEDURE — 3008F PR BODY MASS INDEX (BMI) DOCUMENTED: ICD-10-PCS | Mod: CPTII,S$GLB,, | Performed by: INTERNAL MEDICINE

## 2022-12-01 PROCEDURE — 1160F RVW MEDS BY RX/DR IN RCRD: CPT | Mod: CPTII,S$GLB,, | Performed by: INTERNAL MEDICINE

## 2022-12-01 PROCEDURE — 4010F PR ACE/ARB THEARPY RXD/TAKEN: ICD-10-PCS | Mod: CPTII,S$GLB,, | Performed by: INTERNAL MEDICINE

## 2022-12-01 PROCEDURE — 3077F SYST BP >= 140 MM HG: CPT | Mod: CPTII,S$GLB,, | Performed by: INTERNAL MEDICINE

## 2022-12-01 PROCEDURE — 3079F DIAST BP 80-89 MM HG: CPT | Mod: CPTII,S$GLB,, | Performed by: INTERNAL MEDICINE

## 2022-12-01 PROCEDURE — 3008F BODY MASS INDEX DOCD: CPT | Mod: CPTII,S$GLB,, | Performed by: INTERNAL MEDICINE

## 2022-12-01 PROCEDURE — 1160F PR REVIEW ALL MEDS BY PRESCRIBER/CLIN PHARMACIST DOCUMENTED: ICD-10-PCS | Mod: CPTII,S$GLB,, | Performed by: INTERNAL MEDICINE

## 2022-12-01 PROCEDURE — 3077F PR MOST RECENT SYSTOLIC BLOOD PRESSURE >= 140 MM HG: ICD-10-PCS | Mod: CPTII,S$GLB,, | Performed by: INTERNAL MEDICINE

## 2023-11-28 ENCOUNTER — TELEPHONE (OUTPATIENT)
Dept: HEMATOLOGY/ONCOLOGY | Facility: CLINIC | Age: 63
End: 2023-11-28

## 2023-11-28 ENCOUNTER — LAB VISIT (OUTPATIENT)
Dept: LAB | Facility: HOSPITAL | Age: 63
End: 2023-11-28
Attending: INTERNAL MEDICINE
Payer: COMMERCIAL

## 2023-11-28 DIAGNOSIS — C18.2 MALIGNANT NEOPLASM OF ASCENDING COLON: ICD-10-CM

## 2023-11-28 DIAGNOSIS — C18.2 MALIGNANT NEOPLASM OF ASCENDING COLON: Primary | ICD-10-CM

## 2023-11-28 LAB
ALBUMIN SERPL BCP-MCNC: 4 G/DL (ref 3.5–5.2)
ALP SERPL-CCNC: 51 U/L (ref 55–135)
ALT SERPL W/O P-5'-P-CCNC: 12 U/L (ref 10–44)
ANION GAP SERPL CALC-SCNC: 5 MMOL/L (ref 8–16)
AST SERPL-CCNC: 14 U/L (ref 10–40)
BASOPHILS # BLD AUTO: 0.03 K/UL (ref 0–0.2)
BASOPHILS NFR BLD: 0.6 % (ref 0–1.9)
BILIRUB SERPL-MCNC: 0.3 MG/DL (ref 0.1–1)
BUN SERPL-MCNC: 11 MG/DL (ref 8–23)
CALCIUM SERPL-MCNC: 9.2 MG/DL (ref 8.7–10.5)
CEA SERPL-MCNC: 5.6 NG/ML (ref 0–5)
CHLORIDE SERPL-SCNC: 103 MMOL/L (ref 95–110)
CO2 SERPL-SCNC: 27 MMOL/L (ref 23–29)
CREAT SERPL-MCNC: 0.7 MG/DL (ref 0.5–1.4)
DIFFERENTIAL METHOD: ABNORMAL
EOSINOPHIL # BLD AUTO: 0.2 K/UL (ref 0–0.5)
EOSINOPHIL NFR BLD: 3.5 % (ref 0–8)
ERYTHROCYTE [DISTWIDTH] IN BLOOD BY AUTOMATED COUNT: 13.1 % (ref 11.5–14.5)
EST. GFR  (NO RACE VARIABLE): >60 ML/MIN/1.73 M^2
GLUCOSE SERPL-MCNC: 98 MG/DL (ref 70–110)
HCT VFR BLD AUTO: 37.8 % (ref 37–48.5)
HGB BLD-MCNC: 12.2 G/DL (ref 12–16)
IMM GRANULOCYTES # BLD AUTO: 0.01 K/UL (ref 0–0.04)
IMM GRANULOCYTES NFR BLD AUTO: 0.2 % (ref 0–0.5)
LYMPHOCYTES # BLD AUTO: 2 K/UL (ref 1–4.8)
LYMPHOCYTES NFR BLD: 36.2 % (ref 18–48)
MCH RBC QN AUTO: 28.8 PG (ref 27–31)
MCHC RBC AUTO-ENTMCNC: 32.3 G/DL (ref 32–36)
MCV RBC AUTO: 89 FL (ref 82–98)
MONOCYTES # BLD AUTO: 0.5 K/UL (ref 0.3–1)
MONOCYTES NFR BLD: 9.6 % (ref 4–15)
NEUTROPHILS # BLD AUTO: 2.7 K/UL (ref 1.8–7.7)
NEUTROPHILS NFR BLD: 49.9 % (ref 38–73)
NRBC BLD-RTO: 0 /100 WBC
PLATELET # BLD AUTO: 307 K/UL (ref 150–450)
PMV BLD AUTO: 8.7 FL (ref 9.2–12.9)
POTASSIUM SERPL-SCNC: 4.2 MMOL/L (ref 3.5–5.1)
PROT SERPL-MCNC: 6.8 G/DL (ref 6–8.4)
RBC # BLD AUTO: 4.23 M/UL (ref 4–5.4)
SODIUM SERPL-SCNC: 135 MMOL/L (ref 136–145)
WBC # BLD AUTO: 5.42 K/UL (ref 3.9–12.7)

## 2023-11-28 PROCEDURE — 80053 COMPREHEN METABOLIC PANEL: CPT | Performed by: INTERNAL MEDICINE

## 2023-11-28 PROCEDURE — 85025 COMPLETE CBC W/AUTO DIFF WBC: CPT | Performed by: INTERNAL MEDICINE

## 2023-11-28 PROCEDURE — 36415 COLL VENOUS BLD VENIPUNCTURE: CPT | Performed by: INTERNAL MEDICINE

## 2023-11-28 PROCEDURE — 82378 CARCINOEMBRYONIC ANTIGEN: CPT | Performed by: INTERNAL MEDICINE

## 2023-11-28 NOTE — PROGRESS NOTES
"[facility] Hematology/Oncology  PROGRESS NOTE -  Follow-up Visit      Subjective:       Patient ID:   NAME: Tammy Peterson : 1960     63 y.o. female    Referring Doc: Bret  Other Physicians: CHAVEZ Ruiz; Mary Barr           Chief Complaint: colon cancer f/u       History of Present Illness:     Patient returns today for a  regularly scheduled follow-up visit.  The patient is here today to go over the results of the recently ordered labs, tests and studies. She is here by herself.     She is doing "great". She has been seeing Mary Barr NP; she had a repeat mammogram which was stable per patient    She saw Dr Guerrero with cardiology and he manages her BP    Breathing ok, no CP, SOB, HA' s or N/V; bowels are moving good        Discussed covid 19 precautions - she has been vaccinated but no boosters      ROS:   GEN: normal without any fever, night sweats or weight loss; generalized aches and pains  HEENT: normal with no HA's, sore throat, stiff neck, changes in vision  CV: normal with no CP, SOB, PND, CAVAZOS or orthopnea  PULM: normal with no SOB, cough, hemoptysis, sputum or pleuritic pain  GI: normal with no abdominal pain, nausea, vomiting, constipation, diarrhea, melanotic stools, BRBPR, or hematemesis  : normal with no hematuria, dysuria  BREAST: normal with no mass, discharge, pain  SKIN: normal with no rash, erythema, bruising, or swelling    Pain Scale:  0    Allergies:  Review of patient's allergies indicates:   Allergen Reactions    Codeine      Other reaction(s): Hives       Medications:    Current Outpatient Medications:     amLODIPine (NORVASC) 5 MG tablet, Take 5 mg by mouth once daily., Disp: , Rfl: 4    aspirin (ECOTRIN) 81 MG EC tablet, Take 81 mg by mouth once daily., Disp: , Rfl:     doxycycline (VIBRA-TABS) 100 MG tablet, Take 1 tablet by mouth., Disp: , Rfl:     lisinopril (PRINIVIL,ZESTRIL) 20 MG tablet, Take 20 mg by mouth 2 (two) times a day., Disp: , Rfl:     oxybutynin " (DITROPAN-XL) 10 MG 24 hr tablet, Take 1 tablet by mouth 3 (three) times daily., Disp: , Rfl:     venlafaxine (EFFEXOR) 75 MG tablet, Take 150 mg by mouth., Disp: , Rfl:     PMHx/PSHx Updates:  See patient's last visit with me on 11/30/2022.  See H&P on 8/26/2022        Pathology:   Cancer Staging   No matching staging information was found for the patient.    Colonoscopy 12/10/2021:     SIGMOID COLON POLYP:   - TUBULAR ADENOMA.   - NO HIGH GRADE DYSPLASIA OR MALIGNANCY IS IDENTIFIED.     Right hemicolectomy Feb 22, 2011:     Objective:     Vitals:  Blood pressure 131/75, pulse 69, temperature 97.6 °F (36.4 °C), resp. rate 17, weight 65.2 kg (143 lb 12.8 oz).    Physical Examination:   GEN: no apparent distress, comfortable; AAOx3  HEAD: atraumatic and normocephalic  EYES: no pallor, no icterus, PERRLA  ENT: OMM, no pharyngeal erythema, external ears WNL; no nasal discharge; no thrush  NECK: no masses, thyroid normal, trachea midline, no LAD/LN's, supple  CV: RRR with no murmur; normal pulse; normal S1 and S2; no pedal edema  CHEST: Normal respiratory effort; CTAB; normal breath sounds; no wheeze or crackles  ABDOM: nontender and nondistended; soft; normal bowel sounds; no rebound/guarding; s/p colon resection and two hernia repairs  MUSC/Skeletal: ROM normal; no crepitus; joints normal; no deformities or arthropathy  EXTREM: no clubbing, cyanosis, inflammation or swelling  SKIN: no rashes, lesions, ulcers, petechiae or subcutaneous nodules; tattoos  : no laureano  NEURO: grossly intact; motor/sensory WNL; AAOx3; no tremors  PSYCH: normal mood, affect and behavior  LYMPH: normal cervical, supraclavicular, axillary and groin LN's            Labs:     Lab Results   Component Value Date    WBC 5.42 11/28/2023    HGB 12.2 11/28/2023    HCT 37.8 11/28/2023    MCV 89 11/28/2023     11/28/2023       CMP  Sodium   Date Value Ref Range Status   11/28/2023 135 (L) 136 - 145 mmol/L Final     Potassium   Date Value Ref  Range Status   11/28/2023 4.2 3.5 - 5.1 mmol/L Final     Chloride   Date Value Ref Range Status   11/28/2023 103 95 - 110 mmol/L Final     CO2   Date Value Ref Range Status   11/28/2023 27 23 - 29 mmol/L Final     Glucose   Date Value Ref Range Status   11/28/2023 98 70 - 110 mg/dL Final     BUN   Date Value Ref Range Status   11/28/2023 11 8 - 23 mg/dL Final     Creatinine   Date Value Ref Range Status   11/28/2023 0.7 0.5 - 1.4 mg/dL Final     Calcium   Date Value Ref Range Status   11/28/2023 9.2 8.7 - 10.5 mg/dL Final     Total Protein   Date Value Ref Range Status   11/28/2023 6.8 6.0 - 8.4 g/dL Final     Albumin   Date Value Ref Range Status   11/28/2023 4.0 3.5 - 5.2 g/dL Final     Total Bilirubin   Date Value Ref Range Status   11/28/2023 0.3 0.1 - 1.0 mg/dL Final     Comment:     For infants and newborns, interpretation of results should be based  on gestational age, weight and in agreement with clinical  observations.    Premature Infant recommended reference ranges:  Up to 24 hours.............<8.0 mg/dL  Up to 48 hours............<12.0 mg/dL  3-5 days..................<15.0 mg/dL  6-29 days.................<15.0 mg/dL       Alkaline Phosphatase   Date Value Ref Range Status   11/28/2023 51 (L) 55 - 135 U/L Final     AST   Date Value Ref Range Status   11/28/2023 14 10 - 40 U/L Final     ALT   Date Value Ref Range Status   11/28/2023 12 10 - 44 U/L Final     Anion Gap   Date Value Ref Range Status   11/28/2023 5 (L) 8 - 16 mmol/L Final     eGFR   Date Value Ref Range Status   11/28/2023 >60.0 >60 mL/min/1.73 m^2 Final       Lab Results   Component Value Date    CEA 5.6 (H) 11/28/2023         Radiology/Diagnostic Studies:    PET 9/15/2022:    IMPRESSION:  No evidence of recurrent or metastatic disease     Healing fracture of the anterior right third rib     Prior partial right hemicolectomy  Cholecystectomy and hysterectomy  Right renal cysts with nonobstructing renal stones  Stable 1 cm hepatic cyst        I have reviewed all available lab results and radiology reports.    Assessment/Plan:   (1) 63 y.o. female with diagnosis of colon cancer who has been referred by Dr Antonio Queen with GI for evaluation by medical hematology/oncology. Patient was diagnosed with colon cancer in Feb 2011 in [place], [place].         - She had stage 3B colon cancer in Feb 2011 in [place], [place]. She has colon resection followed by 12 rounds of chemotherapy with FOLFOX.      - She underwent right sided colon resection on 2/22/2011 with pathology reporting a G2 colonic adenocarcinoma 6cm in dimension invading the full thickness of the ascending colon and into the pericolonic adipose tissue and subserosa. The pathology was negative for vascular invasion but she had 4 out of 18 LN's that were positive for cancer. The distal and proximal margins were negative but the serosal margin was within <1cm.   - she was followed by [facility]     - she had recent colonoscopy in Dec 2021 with Dr Queen which just showed a tubular adenoma    12/1/2022:  - PEt on 9/15/2022 evidence of recurrent or metastatic disease  - CEA was 5.8 and chronically elevated but stable  - colonoscopy was in last Dec 2021 - followed by Dr Queen    11/29/2023:  - doing well with no new issues  - still getting mammograms per [facility]  - CEA at 5.6 and stable if not better     (2) HTN     (3) Seborrheic keratosis of scalp     (4) Former smoker (quit 2011)       VISIT DIAGNOSES:      Malignant neoplasm of ascending colon (Feb 2011 - stage III adenocarcinoma with 4 out of 18 LN's positive)    S/P right hemicolectomy          PLAN:  1. continue with surveillance  2. F/u with PCP, Card and GI  3. Check CEA every 6 months  4. Mammograms as per [facility]   . RTC in 12 months  Fax note to Bret; Cesar Barr    Discussion:     COVID-19 Discussion:     I had long discussion with patient and any applicable family about the COVID-19 coronavirus epidemic and the recommended  "precautions with regard to cancer and/or hematology patients. I have re-iterated the CDC recommendations for adequate hand washing, use of hand -like products, and coughing into elbow, etc. In addition, especially for our patients who are on chemotherapy and/or our otherwise immunocompromised patients, I have recommended avoidance of crowds, including movie theaters, restaurants, churches, etc. I have recommended avoidance of any sick or symptomatic family members and/or friends. I have also recommended avoidance of any raw and unwashed food products, and general avoidance of food items that have not been prepared by themselves. The patient has been asked to call us immediately with any symptom developments, issues, questions or other general concerns.  I spent over 25 mins of time with the patient. Reviewed results of the recently ordered labs, tests and studies; made directives with regards to the results. Over half of this time was spent couseling and coordinating care.    Pathology Discussion:     I reviewed and discussed the pathology report(s) and radiograph reports (if available) in as simple to understand and/or laymen's terms to the best of my ability. I had an indepth conversation with the patient and went over the patient's individual diagnosis based on the information that was currently available. I discussed the TNM staging process with regard to the patient's particular cancer type, and the calculated stage based on the currently available TNM data and literature. I discussed the available prognostic data with regard to the current staging information and how it relates to the prognosis of their particular neoplastic process.             NCCN Guidelines:     I discussed the available treatment option(s) in accordance with the latest literature from the NCCN Clinical Practice Guidelines for the patient's particular type of cancer disorder. The NCCN Guidelines provide a "document evidence-based " "(and) consensus-driven management" of the care of oncology patients. The treatment recommendations were made not only in accordance to the NCCN guidelines, but also factored in to account the patient's overall age, condition, performance status and their medical co-morbidities. I went over the risks and benefits of the the treatment options (if any could be made) with regard to their particular cancer type, their cancer stage, their age, and their co-morbidities.      I have explained and the patient understands all of  the current recommendation(s). I have answered all of their questions to the best of my ability and to their complete satisfaction.                  I have explained all of the above in detail and the patient understands all of the current recommendation(s). I have answered all of their questions to the best of my ability and to their complete satisfaction.   The patient is to continue with the current management plan.            Electronically signed by Eder Camacho MD             Answers submitted by the patient for this visit:  Review of Systems Questionnaire (Submitted on 11/27/2023)  appetite change : No  unexpected weight change: No  mouth sores: No  visual disturbance: No  cough: No  shortness of breath: No  chest pain: No  abdominal pain: No  diarrhea: No  frequency: Yes  back pain: No  rash: No  headaches: No  adenopathy: No  nervous/ anxious: No    "

## 2023-11-29 ENCOUNTER — OFFICE VISIT (OUTPATIENT)
Dept: HEMATOLOGY/ONCOLOGY | Facility: CLINIC | Age: 63
End: 2023-11-29
Payer: COMMERCIAL

## 2023-11-29 VITALS
HEART RATE: 69 BPM | BODY MASS INDEX: 26.3 KG/M2 | TEMPERATURE: 98 F | DIASTOLIC BLOOD PRESSURE: 75 MMHG | SYSTOLIC BLOOD PRESSURE: 131 MMHG | WEIGHT: 143.81 LBS | RESPIRATION RATE: 17 BRPM

## 2023-11-29 DIAGNOSIS — C18.2 MALIGNANT NEOPLASM OF ASCENDING COLON: Primary | ICD-10-CM

## 2023-11-29 DIAGNOSIS — Z90.49 S/P RIGHT HEMICOLECTOMY: ICD-10-CM

## 2023-11-29 PROCEDURE — 4010F PR ACE/ARB THEARPY RXD/TAKEN: ICD-10-PCS | Mod: CPTII,S$GLB,, | Performed by: INTERNAL MEDICINE

## 2023-11-29 PROCEDURE — 3075F PR MOST RECENT SYSTOLIC BLOOD PRESS GE 130-139MM HG: ICD-10-PCS | Mod: CPTII,S$GLB,, | Performed by: INTERNAL MEDICINE

## 2023-11-29 PROCEDURE — 3008F PR BODY MASS INDEX (BMI) DOCUMENTED: ICD-10-PCS | Mod: CPTII,S$GLB,, | Performed by: INTERNAL MEDICINE

## 2023-11-29 PROCEDURE — 1159F PR MEDICATION LIST DOCUMENTED IN MEDICAL RECORD: ICD-10-PCS | Mod: CPTII,S$GLB,, | Performed by: INTERNAL MEDICINE

## 2023-11-29 PROCEDURE — 3078F DIAST BP <80 MM HG: CPT | Mod: CPTII,S$GLB,, | Performed by: INTERNAL MEDICINE

## 2023-11-29 PROCEDURE — 3008F BODY MASS INDEX DOCD: CPT | Mod: CPTII,S$GLB,, | Performed by: INTERNAL MEDICINE

## 2023-11-29 PROCEDURE — 1160F RVW MEDS BY RX/DR IN RCRD: CPT | Mod: CPTII,S$GLB,, | Performed by: INTERNAL MEDICINE

## 2023-11-29 PROCEDURE — 99214 PR OFFICE/OUTPT VISIT, EST, LEVL IV, 30-39 MIN: ICD-10-PCS | Mod: S$GLB,,, | Performed by: INTERNAL MEDICINE

## 2023-11-29 PROCEDURE — 99214 OFFICE O/P EST MOD 30 MIN: CPT | Mod: S$GLB,,, | Performed by: INTERNAL MEDICINE

## 2023-11-29 PROCEDURE — 1159F MED LIST DOCD IN RCRD: CPT | Mod: CPTII,S$GLB,, | Performed by: INTERNAL MEDICINE

## 2023-11-29 PROCEDURE — 4010F ACE/ARB THERAPY RXD/TAKEN: CPT | Mod: CPTII,S$GLB,, | Performed by: INTERNAL MEDICINE

## 2023-11-29 PROCEDURE — 1160F PR REVIEW ALL MEDS BY PRESCRIBER/CLIN PHARMACIST DOCUMENTED: ICD-10-PCS | Mod: CPTII,S$GLB,, | Performed by: INTERNAL MEDICINE

## 2023-11-29 PROCEDURE — 3078F PR MOST RECENT DIASTOLIC BLOOD PRESSURE < 80 MM HG: ICD-10-PCS | Mod: CPTII,S$GLB,, | Performed by: INTERNAL MEDICINE

## 2023-11-29 PROCEDURE — 3075F SYST BP GE 130 - 139MM HG: CPT | Mod: CPTII,S$GLB,, | Performed by: INTERNAL MEDICINE

## 2024-11-18 ENCOUNTER — TELEPHONE (OUTPATIENT)
Facility: CLINIC | Age: 64
End: 2024-11-18
Payer: COMMERCIAL

## 2024-11-19 ENCOUNTER — LAB VISIT (OUTPATIENT)
Dept: LAB | Facility: HOSPITAL | Age: 64
End: 2024-11-19
Attending: INTERNAL MEDICINE
Payer: COMMERCIAL

## 2024-11-19 ENCOUNTER — OFFICE VISIT (OUTPATIENT)
Facility: CLINIC | Age: 64
End: 2024-11-19
Payer: COMMERCIAL

## 2024-11-19 VITALS
DIASTOLIC BLOOD PRESSURE: 84 MMHG | HEART RATE: 62 BPM | SYSTOLIC BLOOD PRESSURE: 142 MMHG | HEIGHT: 62 IN | WEIGHT: 135.19 LBS | TEMPERATURE: 99 F | BODY MASS INDEX: 24.88 KG/M2 | RESPIRATION RATE: 17 BRPM

## 2024-11-19 DIAGNOSIS — Z90.49 S/P RIGHT HEMICOLECTOMY: ICD-10-CM

## 2024-11-19 DIAGNOSIS — C18.2 MALIGNANT NEOPLASM OF ASCENDING COLON: ICD-10-CM

## 2024-11-19 DIAGNOSIS — C18.2 MALIGNANT NEOPLASM OF ASCENDING COLON: Primary | ICD-10-CM

## 2024-11-19 LAB
ALBUMIN SERPL BCP-MCNC: 3.9 G/DL (ref 3.5–5.2)
ALP SERPL-CCNC: 50 U/L (ref 55–135)
ALT SERPL W/O P-5'-P-CCNC: 11 U/L (ref 10–44)
ANION GAP SERPL CALC-SCNC: 6 MMOL/L (ref 8–16)
AST SERPL-CCNC: 13 U/L (ref 10–40)
BASOPHILS # BLD AUTO: 0.06 K/UL (ref 0–0.2)
BASOPHILS NFR BLD: 1 % (ref 0–1.9)
BILIRUB SERPL-MCNC: 0.3 MG/DL (ref 0.1–1)
BUN SERPL-MCNC: 10 MG/DL (ref 8–23)
CALCIUM SERPL-MCNC: 9.5 MG/DL (ref 8.7–10.5)
CEA SERPL-MCNC: 6 NG/ML (ref 0–5)
CHLORIDE SERPL-SCNC: 102 MMOL/L (ref 95–110)
CO2 SERPL-SCNC: 30 MMOL/L (ref 23–29)
CREAT SERPL-MCNC: 0.6 MG/DL (ref 0.5–1.4)
DIFFERENTIAL METHOD BLD: ABNORMAL
EOSINOPHIL # BLD AUTO: 0.2 K/UL (ref 0–0.5)
EOSINOPHIL NFR BLD: 2.9 % (ref 0–8)
ERYTHROCYTE [DISTWIDTH] IN BLOOD BY AUTOMATED COUNT: 12.8 % (ref 11.5–14.5)
EST. GFR  (NO RACE VARIABLE): >60 ML/MIN/1.73 M^2
GLUCOSE SERPL-MCNC: 114 MG/DL (ref 70–110)
HCT VFR BLD AUTO: 39.6 % (ref 37–48.5)
HGB BLD-MCNC: 13.1 G/DL (ref 12–16)
IMM GRANULOCYTES # BLD AUTO: 0.03 K/UL (ref 0–0.04)
IMM GRANULOCYTES NFR BLD AUTO: 0.5 % (ref 0–0.5)
LYMPHOCYTES # BLD AUTO: 1.7 K/UL (ref 1–4.8)
LYMPHOCYTES NFR BLD: 29.6 % (ref 18–48)
MCH RBC QN AUTO: 28.9 PG (ref 27–31)
MCHC RBC AUTO-ENTMCNC: 33.1 G/DL (ref 32–36)
MCV RBC AUTO: 87 FL (ref 82–98)
MONOCYTES # BLD AUTO: 0.7 K/UL (ref 0.3–1)
MONOCYTES NFR BLD: 11.2 % (ref 4–15)
NEUTROPHILS # BLD AUTO: 3.2 K/UL (ref 1.8–7.7)
NEUTROPHILS NFR BLD: 54.8 % (ref 38–73)
NRBC BLD-RTO: 0 /100 WBC
PLATELET # BLD AUTO: 329 K/UL (ref 150–450)
PMV BLD AUTO: 9 FL (ref 9.2–12.9)
POTASSIUM SERPL-SCNC: 5 MMOL/L (ref 3.5–5.1)
PROT SERPL-MCNC: 6.6 G/DL (ref 6–8.4)
RBC # BLD AUTO: 4.54 M/UL (ref 4–5.4)
SODIUM SERPL-SCNC: 138 MMOL/L (ref 136–145)
WBC # BLD AUTO: 5.81 K/UL (ref 3.9–12.7)

## 2024-11-19 PROCEDURE — 80053 COMPREHEN METABOLIC PANEL: CPT | Performed by: INTERNAL MEDICINE

## 2024-11-19 PROCEDURE — 1160F RVW MEDS BY RX/DR IN RCRD: CPT | Mod: CPTII,S$GLB,, | Performed by: INTERNAL MEDICINE

## 2024-11-19 PROCEDURE — 99213 OFFICE O/P EST LOW 20 MIN: CPT | Mod: S$GLB,,, | Performed by: INTERNAL MEDICINE

## 2024-11-19 PROCEDURE — 4010F ACE/ARB THERAPY RXD/TAKEN: CPT | Mod: CPTII,S$GLB,, | Performed by: INTERNAL MEDICINE

## 2024-11-19 PROCEDURE — 3079F DIAST BP 80-89 MM HG: CPT | Mod: CPTII,S$GLB,, | Performed by: INTERNAL MEDICINE

## 2024-11-19 PROCEDURE — 3077F SYST BP >= 140 MM HG: CPT | Mod: CPTII,S$GLB,, | Performed by: INTERNAL MEDICINE

## 2024-11-19 PROCEDURE — 3008F BODY MASS INDEX DOCD: CPT | Mod: CPTII,S$GLB,, | Performed by: INTERNAL MEDICINE

## 2024-11-19 PROCEDURE — 85025 COMPLETE CBC W/AUTO DIFF WBC: CPT | Performed by: INTERNAL MEDICINE

## 2024-11-19 PROCEDURE — 82378 CARCINOEMBRYONIC ANTIGEN: CPT | Performed by: INTERNAL MEDICINE

## 2024-11-19 PROCEDURE — 99999 PR PBB SHADOW E&M-EST. PATIENT-LVL III: CPT | Mod: PBBFAC,,, | Performed by: INTERNAL MEDICINE

## 2024-11-19 PROCEDURE — G2211 COMPLEX E/M VISIT ADD ON: HCPCS | Mod: S$GLB,,, | Performed by: INTERNAL MEDICINE

## 2024-11-19 PROCEDURE — 36415 COLL VENOUS BLD VENIPUNCTURE: CPT | Performed by: INTERNAL MEDICINE

## 2024-11-19 PROCEDURE — 1159F MED LIST DOCD IN RCRD: CPT | Mod: CPTII,S$GLB,, | Performed by: INTERNAL MEDICINE

## 2024-11-19 NOTE — PROGRESS NOTES
"[facility] Hematology/Oncology  PROGRESS NOTE -  Follow-up Visit      Subjective:       Patient ID:   NAME: Tammy Peterson : 1960     64 y.o. female    Referring Doc: Bret  Other Physicians: CHAVEZ Ruiz; Mary Barr           Chief Complaint: colon cancer f/u       History of Present Illness:     Patient returns today for a  regularly scheduled follow-up visit.  The patient is here today to go over the results of the recently ordered labs, tests and studies. She is here by herself.     She is doing "great". She has been seeing Mary Barr NP;     She is due for colonoscopy in near future with Dr Queen     Breathing ok, no CP, SOB, HA' s or N/V; bowels are moving good    She is due for follow-up with Dr corcoran - BP has been good        Discussed covid 19 precautions - she has been vaccinated but no boosters      ROS:   GEN: normal without any fever, night sweats or weight loss; generalized aches and pains from arthritis etc  HEENT: normal with no HA's, sore throat, stiff neck, changes in vision  CV: normal with no CP, SOB, PND, CAVAZOS or orthopnea  PULM: normal with no SOB, cough, hemoptysis, sputum or pleuritic pain  GI: normal with no abdominal pain, nausea, vomiting, constipation, diarrhea, melanotic stools, BRBPR, or hematemesis  : normal with no hematuria, dysuria  BREAST: normal with no mass, discharge, pain  SKIN: normal with no rash, erythema, bruising, or swelling    Pain Scale:  0    Allergies:  Review of patient's allergies indicates:   Allergen Reactions    Codeine      Other reaction(s): Hives       Medications:    Current Outpatient Medications:     amLODIPine (NORVASC) 5 MG tablet, Take 5 mg by mouth once daily., Disp: , Rfl: 4    aspirin (ECOTRIN) 81 MG EC tablet, Take 81 mg by mouth once daily., Disp: , Rfl:     doxycycline (VIBRA-TABS) 100 MG tablet, Take 1 tablet by mouth., Disp: , Rfl:     lisinopril (PRINIVIL,ZESTRIL) 20 MG tablet, Take 20 mg by mouth 2 (two) times a day., Disp: , Rfl: " "    oxybutynin (DITROPAN-XL) 10 MG 24 hr tablet, Take 1 tablet by mouth 3 (three) times daily., Disp: , Rfl:     venlafaxine (EFFEXOR) 75 MG tablet, Take 150 mg by mouth., Disp: , Rfl:     PMHx/PSHx Updates:  See patient's last visit with me on 11/29/2023  See H&P on 8/26/2022        Pathology:   Cancer Staging   No matching staging information was found for the patient.      Colonoscopy 12/10/2021:     SIGMOID COLON POLYP:   - TUBULAR ADENOMA.   - NO HIGH GRADE DYSPLASIA OR MALIGNANCY IS IDENTIFIED.     Right hemicolectomy Feb 22, 2011:     Objective:     Vitals:  Blood pressure (!) 142/84, pulse 62, temperature 98.6 °F (37 °C), resp. rate 17, height 5' 2" (1.575 m), weight 61.3 kg (135 lb 3.2 oz).    Physical Examination:   GEN: no apparent distress, comfortable; AAOx3  HEAD: atraumatic and normocephalic  EYES: no pallor, no icterus, PERRLA  ENT: OMM, no pharyngeal erythema, external ears WNL; no nasal discharge; no thrush  NECK: no masses, thyroid normal, trachea midline, no LAD/LN's, supple  CV: RRR with no murmur; normal pulse; normal S1 and S2; no pedal edema  CHEST: Normal respiratory effort; CTAB; normal breath sounds; no wheeze or crackles  ABDOM: nontender and nondistended; soft; normal bowel sounds; no rebound/guarding; s/p colon resection and two hernia repairs  MUSC/Skeletal: ROM normal; no crepitus; joints normal; no deformities or arthropathy  EXTREM: no clubbing, cyanosis, inflammation or swelling  SKIN: no rashes, lesions, ulcers, petechiae or subcutaneous nodules; tattoos  : no laureano  NEURO: grossly intact; motor/sensory WNL; AAOx3; no tremors  PSYCH: normal mood, affect and behavior  LYMPH: normal cervical, supraclavicular, axillary and groin LN's            Labs:       Labs due today      Lab Results   Component Value Date    WBC 5.42 11/28/2023    HGB 12.2 11/28/2023    HCT 37.8 11/28/2023    MCV 89 11/28/2023     11/28/2023       CMP  Sodium   Date Value Ref Range Status   11/28/2023 " 135 (L) 136 - 145 mmol/L Final     Potassium   Date Value Ref Range Status   11/28/2023 4.2 3.5 - 5.1 mmol/L Final     Chloride   Date Value Ref Range Status   11/28/2023 103 95 - 110 mmol/L Final     CO2   Date Value Ref Range Status   11/28/2023 27 23 - 29 mmol/L Final     Glucose   Date Value Ref Range Status   11/28/2023 98 70 - 110 mg/dL Final     BUN   Date Value Ref Range Status   11/28/2023 11 8 - 23 mg/dL Final     Creatinine   Date Value Ref Range Status   11/28/2023 0.7 0.5 - 1.4 mg/dL Final     Calcium   Date Value Ref Range Status   11/28/2023 9.2 8.7 - 10.5 mg/dL Final     Total Protein   Date Value Ref Range Status   11/28/2023 6.8 6.0 - 8.4 g/dL Final     Albumin   Date Value Ref Range Status   11/28/2023 4.0 3.5 - 5.2 g/dL Final     Total Bilirubin   Date Value Ref Range Status   11/28/2023 0.3 0.1 - 1.0 mg/dL Final     Comment:     For infants and newborns, interpretation of results should be based  on gestational age, weight and in agreement with clinical  observations.    Premature Infant recommended reference ranges:  Up to 24 hours.............<8.0 mg/dL  Up to 48 hours............<12.0 mg/dL  3-5 days..................<15.0 mg/dL  6-29 days.................<15.0 mg/dL       Alkaline Phosphatase   Date Value Ref Range Status   11/28/2023 51 (L) 55 - 135 U/L Final     AST   Date Value Ref Range Status   11/28/2023 14 10 - 40 U/L Final     ALT   Date Value Ref Range Status   11/28/2023 12 10 - 44 U/L Final     Anion Gap   Date Value Ref Range Status   11/28/2023 5 (L) 8 - 16 mmol/L Final     eGFR   Date Value Ref Range Status   11/28/2023 >60.0 >60 mL/min/1.73 m^2 Final       Lab Results   Component Value Date    CEA 5.6 (H) 11/28/2023         Radiology/Diagnostic Studies:    PET 9/15/2022:    IMPRESSION:  No evidence of recurrent or metastatic disease     Healing fracture of the anterior right third rib     Prior partial right hemicolectomy  Cholecystectomy and hysterectomy  Right renal cysts  with nonobstructing renal stones  Stable 1 cm hepatic cyst       I have reviewed all available lab results and radiology reports.    Assessment/Plan:   (1) 64 y.o. female with diagnosis of colon cancer who has been referred by Dr Antonio Queen with GI for evaluation by medical hematology/oncology. Patient was diagnosed with colon cancer in Feb 2011 in [place], [place].         - She had stage 3B colon cancer in Feb 2011 in [place], [place]. She has colon resection followed by 12 rounds of chemotherapy with FOLFOX.      - She underwent right sided colon resection on 2/22/2011 with pathology reporting a G2 colonic adenocarcinoma 6cm in dimension invading the full thickness of the ascending colon and into the pericolonic adipose tissue and subserosa. The pathology was negative for vascular invasion but she had 4 out of 18 LN's that were positive for cancer. The distal and proximal margins were negative but the serosal margin was within <1cm.   - she was followed by [facility]     - she had recent colonoscopy in Dec 2021 with Dr Queen which just showed a tubular adenoma    12/1/2022:  - PEt on 9/15/2022 evidence of recurrent or metastatic disease  - CEA was 5.8 and chronically elevated but stable  - colonoscopy was in last Dec 2021 - followed by Dr Queen    11/29/2023:  - doing well with no new issues  - still getting mammograms per [facility]  - CEA at 5.6 and stable if not better    11/19/2024:  - checking labs again today  - getting colonoscopy with Dr Queen in near future     (2) HTN     (3) Seborrheic keratosis of scalp     (4) Former smoker (quit 2011)       VISIT DIAGNOSES:      Malignant neoplasm of ascending colon (Feb 2011 - stage III adenocarcinoma with 4 out of 18 LN's positive)    S/P right hemicolectomy          PLAN:  1. continue with surveillance only  2. F/u with PCP, Card and GI  3. Check CEA every 6 months - due today  4. Mammograms as per [facility]'s direction  5. Proceed with repeat colonoscopy in near  future   . RTC in 12 months  Fax note to Cesar Hernández    Discussion:     COVID-19 Discussion:     I had long discussion with patient and any applicable family about the COVID-19 coronavirus epidemic and the recommended precautions with regard to cancer and/or hematology patients. I have re-iterated the CDC recommendations for adequate hand washing, use of hand -like products, and coughing into elbow, etc. In addition, especially for our patients who are on chemotherapy and/or our otherwise immunocompromised patients, I have recommended avoidance of crowds, including movie theaters, restaurants, churches, etc. I have recommended avoidance of any sick or symptomatic family members and/or friends. I have also recommended avoidance of any raw and unwashed food products, and general avoidance of food items that have not been prepared by themselves. The patient has been asked to call us immediately with any symptom developments, issues, questions or other general concerns.  I spent over 25 mins of time with the patient. Reviewed results of the recently ordered labs, tests and studies; made directives with regards to the results. Over half of this time was spent couseling and coordinating care.    Pathology Discussion:     I reviewed and discussed the pathology report(s) and radiograph reports (if available) in as simple to understand and/or laymen's terms to the best of my ability. I had an indepth conversation with the patient and went over the patient's individual diagnosis based on the information that was currently available. I discussed the TNM staging process with regard to the patient's particular cancer type, and the calculated stage based on the currently available TNM data and literature. I discussed the available prognostic data with regard to the current staging information and how it relates to the prognosis of their particular neoplastic process.             NCCN Guidelines:     I  "discussed the available treatment option(s) in accordance with the latest literature from the NCCN Clinical Practice Guidelines for the patient's particular type of cancer disorder. The NCCN Guidelines provide a "document evidence-based (and) consensus-driven management" of the care of oncology patients. The treatment recommendations were made not only in accordance to the NCCN guidelines, but also factored in to account the patient's overall age, condition, performance status and their medical co-morbidities. I went over the risks and benefits of the the treatment options (if any could be made) with regard to their particular cancer type, their cancer stage, their age, and their co-morbidities.      I have explained and the patient understands all of  the current recommendation(s). I have answered all of their questions to the best of my ability and to their complete satisfaction.                  I have explained all of the above in detail and the patient understands all of the current recommendation(s). I have answered all of their questions to the best of my ability and to their complete satisfaction.   The patient is to continue with the current management plan.            Electronically signed by Eder Camacho MD                "

## (undated) DEVICE — SOL IRR NACL .9% 3000ML

## (undated) DEVICE — IMPLANTABLE DEVICE: Type: IMPLANTABLE DEVICE | Site: SHOULDER | Status: NON-FUNCTIONAL

## (undated) DEVICE — SUT FIBERWIRE

## (undated) DEVICE — DRESSING N ADH OIL EMUL 3X3

## (undated) DEVICE — PROBE ABLATION RF ARTHSCP 3.5

## (undated) DEVICE — NDL BOX COUNTER

## (undated) DEVICE — SOL 9P NACL IRR PIC IL

## (undated) DEVICE — TUBING ARTHRO IRR 4-LEAD

## (undated) DEVICE — PACK SHOULDER

## (undated) DEVICE — UNDERGLOVE BIOGEL PI SZ 6.5 LF

## (undated) DEVICE — GLOVE SURG ULTRA TOUCH 6

## (undated) DEVICE — SYS LABEL CORRECT MED

## (undated) DEVICE — MANIFOLD 4 PORT

## (undated) DEVICE — GLOVE SURG ULTRA TOUCH 8

## (undated) DEVICE — SKINMARKER W/RULER DEVON

## (undated) DEVICE — TAPE SILK 3IN

## (undated) DEVICE — TRAY DRY SPONGE SCRUB W FOAM

## (undated) DEVICE — SEE MEDLINE ITEM 146313

## (undated) DEVICE — SEE MEDLINE ITEM 152622

## (undated) DEVICE — CONTAINER SPECIMEN STRL 4OZ

## (undated) DEVICE — Device

## (undated) DEVICE — STRIP SKIN TRACTION ADH 3X40 L

## (undated) DEVICE — STRAP OR TABLE 5IN X 72IN

## (undated) DEVICE — STAPLER SKIN ROTATING HEAD

## (undated) DEVICE — SEE MEDLINE ITEM 152487

## (undated) DEVICE — ALCOHOL 70% ISOP RUBBING 4OZ

## (undated) DEVICE — BRUSH SCRUB HIBICLENS 4%

## (undated) DEVICE — SCRUB HIBICLENS 4% CHG 4OZ

## (undated) DEVICE — COVER SURG LIGHT HANDLE

## (undated) DEVICE — KIT FIXATION PERC ARTHSCP 2.9

## (undated) DEVICE — PASSER SUTURE LASSO STRGHT 90

## (undated) DEVICE — SEE MEDLINE ITEM 157131

## (undated) DEVICE — SEE MEDLINE ITEM 146420

## (undated) DEVICE — SYR 50CC LL

## (undated) DEVICE — CUTTER MENISCUS AGGRESSIVE 4.0

## (undated) DEVICE — NDL SPINAL 18GX3.5 SPINOCAN

## (undated) DEVICE — ELECTRODE REM PLYHSV RETURN 9

## (undated) DEVICE — SPONGE SUPER KERLIX 6X6.75IN

## (undated) DEVICE — SEE MEDLINE ITEM 153151

## (undated) DEVICE — SLING SHLDR IMMOBILIZER LG

## (undated) DEVICE — DRAPE INCISE IOBAN 2 23X17IN

## (undated) DEVICE — SEE MEDLINE ITEM 146308

## (undated) DEVICE — PAD ABD 8X10 STERILE

## (undated) DEVICE — GOWN X-LG STERILE BACK

## (undated) DEVICE — SPONGE GAUZE 16PLY 4X4

## (undated) DEVICE — DRAPE STERI INSTRUMENT 1018

## (undated) DEVICE — TAPE SURGICAL MICROFOAM 4IN

## (undated) DEVICE — BLADE SURG CARBON STEEL SZ11

## (undated) DEVICE — SUT ETHILON 3-0 PS2 18 BLK

## (undated) DEVICE — GLOVE SURG ULTRA TOUCH 7

## (undated) DEVICE — BLADE SURG #15 CARBON STEEL